# Patient Record
Sex: MALE | HISPANIC OR LATINO | URBAN - METROPOLITAN AREA
[De-identification: names, ages, dates, MRNs, and addresses within clinical notes are randomized per-mention and may not be internally consistent; named-entity substitution may affect disease eponyms.]

---

## 2018-03-02 ENCOUNTER — OUTPATIENT (OUTPATIENT)
Dept: OUTPATIENT SERVICES | Facility: HOSPITAL | Age: 83
LOS: 1 days | Discharge: HOME | End: 2018-03-02
Payer: MEDICARE

## 2018-03-02 VITALS
RESPIRATION RATE: 14 BRPM | DIASTOLIC BLOOD PRESSURE: 59 MMHG | WEIGHT: 166.01 LBS | HEIGHT: 67 IN | TEMPERATURE: 97 F | HEART RATE: 53 BPM | SYSTOLIC BLOOD PRESSURE: 208 MMHG | OXYGEN SATURATION: 98 %

## 2018-03-02 DIAGNOSIS — Z98.890 OTHER SPECIFIED POSTPROCEDURAL STATES: Chronic | ICD-10-CM

## 2018-03-02 DIAGNOSIS — I47.1 SUPRAVENTRICULAR TACHYCARDIA: ICD-10-CM

## 2018-03-02 LAB
ANION GAP SERPL CALC-SCNC: 4 MMOL/L — LOW (ref 5–17)
BASOPHILS # BLD AUTO: 0.1 K/UL — SIGNIFICANT CHANGE UP (ref 0–0.2)
BASOPHILS NFR BLD AUTO: 1.3 % — SIGNIFICANT CHANGE UP (ref 0–2)
BUN SERPL-MCNC: 38 MG/DL — HIGH (ref 7–23)
CALCIUM SERPL-MCNC: 8.7 MG/DL — SIGNIFICANT CHANGE UP (ref 8.5–10.1)
CHLORIDE SERPL-SCNC: 110 MMOL/L — HIGH (ref 96–108)
CO2 SERPL-SCNC: 27 MMOL/L — SIGNIFICANT CHANGE UP (ref 22–31)
CREAT SERPL-MCNC: 2.03 MG/DL — HIGH (ref 0.5–1.3)
EOSINOPHIL # BLD AUTO: 0.2 K/UL — SIGNIFICANT CHANGE UP (ref 0–0.5)
EOSINOPHIL NFR BLD AUTO: 2.9 % — SIGNIFICANT CHANGE UP (ref 0–6)
GLUCOSE SERPL-MCNC: 90 MG/DL — SIGNIFICANT CHANGE UP (ref 70–99)
HCT VFR BLD CALC: 40.4 % — SIGNIFICANT CHANGE UP (ref 39–50)
HGB BLD-MCNC: 13.2 G/DL — SIGNIFICANT CHANGE UP (ref 13–17)
LYMPHOCYTES # BLD AUTO: 1.8 K/UL — SIGNIFICANT CHANGE UP (ref 1–3.3)
LYMPHOCYTES # BLD AUTO: 25.7 % — SIGNIFICANT CHANGE UP (ref 13–44)
MCHC RBC-ENTMCNC: 30.7 PG — SIGNIFICANT CHANGE UP (ref 27–34)
MCHC RBC-ENTMCNC: 32.6 GM/DL — SIGNIFICANT CHANGE UP (ref 32–36)
MCV RBC AUTO: 94.1 FL — SIGNIFICANT CHANGE UP (ref 80–100)
MONOCYTES # BLD AUTO: 0.5 K/UL — SIGNIFICANT CHANGE UP (ref 0–0.9)
MONOCYTES NFR BLD AUTO: 7.8 % — SIGNIFICANT CHANGE UP (ref 2–14)
NEUTROPHILS # BLD AUTO: 4.3 K/UL — SIGNIFICANT CHANGE UP (ref 1.8–7.4)
NEUTROPHILS NFR BLD AUTO: 62.2 % — SIGNIFICANT CHANGE UP (ref 43–77)
PLATELET # BLD AUTO: 190 K/UL — SIGNIFICANT CHANGE UP (ref 150–400)
POTASSIUM SERPL-MCNC: 5.4 MMOL/L — HIGH (ref 3.5–5.3)
POTASSIUM SERPL-SCNC: 5.4 MMOL/L — HIGH (ref 3.5–5.3)
RBC # BLD: 4.29 M/UL — SIGNIFICANT CHANGE UP (ref 4.2–5.8)
RBC # FLD: 13.1 % — SIGNIFICANT CHANGE UP (ref 10.3–14.5)
SODIUM SERPL-SCNC: 141 MMOL/L — SIGNIFICANT CHANGE UP (ref 135–145)
WBC # BLD: 7 K/UL — SIGNIFICANT CHANGE UP (ref 3.8–10.5)
WBC # FLD AUTO: 7 K/UL — SIGNIFICANT CHANGE UP (ref 3.8–10.5)

## 2018-03-02 PROCEDURE — 93010 ELECTROCARDIOGRAM REPORT: CPT

## 2018-03-02 NOTE — H&P PST ADULT - PMH
Anxiety disorder    Arrhythmia    Dementia    Hypertension  was taken off medication by Dr Fischer 2016  Prostate cancer    Pure hypercholesterolemia

## 2018-03-02 NOTE — H&P PST ADULT - HISTORY OF PRESENT ILLNESS
86 yo  male with dementia presents to PST. Understands & speaks some english but daughter Day providing information. Daughter reports pt was having episodes of "passing out" in Oct/Nov 2016 & had LINQ placed nov 2016 by Dr Em. Daughter states reports from the LINQ indicate episodes  a "fast heart rate". Denies chest pain or syncope. 86 yo  male with dementia presents to PST. Understands & speaks little english but daughter Day providing information. Daughter reports pt was having episodes of "passing out" in Oct/Nov 2016 & had LINQ placed nov 2016 by Dr Em. Daughter states reports from the LINQ indicate episodes  a "fast heart rate". Denies chest pain or syncope.

## 2018-03-02 NOTE — H&P PST ADULT - ASSESSMENT
yo scheduled for   with    on     1. Labs as per surgeon  2. EKG  3. Medical clearance with  4. discussed EZ sponges & day of procedure instructions 84 yo male scheduled for cardiac ablation on 3/12/18  with Dr. Em     1. Labs as per surgeon  2. EKG  3. discussed EZ sponges with pt & daughter  4. Medication & day of procedure instructions as per EP staff 86 yo male scheduled for cardiac ablation on 3/12/18  with Dr. Em     1. Labs as per surgeon  2. EKG  3. discussed EZ sponges with pt & daughter  4. Medication & day of procedure instructions as per EP staff  Daughter Day going to address elevated BP readings with Dr Fischer to possibly restart BP meds

## 2018-03-02 NOTE — H&P PST ADULT - PSH
H/O left knee surgery    H/O right inguinal hernia repair    History of loop recorder  LINQ hear monitor placed

## 2018-03-12 ENCOUNTER — OUTPATIENT (OUTPATIENT)
Dept: OUTPATIENT SERVICES | Facility: HOSPITAL | Age: 83
LOS: 1 days | Discharge: HOME | End: 2018-03-12
Payer: MEDICARE

## 2018-03-12 VITALS
HEIGHT: 67 IN | SYSTOLIC BLOOD PRESSURE: 214 MMHG | RESPIRATION RATE: 18 BRPM | TEMPERATURE: 97 F | HEART RATE: 55 BPM | DIASTOLIC BLOOD PRESSURE: 63 MMHG | OXYGEN SATURATION: 100 % | WEIGHT: 164.91 LBS

## 2018-03-12 DIAGNOSIS — Z98.890 OTHER SPECIFIED POSTPROCEDURAL STATES: Chronic | ICD-10-CM

## 2018-03-12 LAB
ANION GAP SERPL CALC-SCNC: 8 MMOL/L — SIGNIFICANT CHANGE UP (ref 5–17)
BUN SERPL-MCNC: 34 MG/DL — HIGH (ref 7–23)
CALCIUM SERPL-MCNC: 8.9 MG/DL — SIGNIFICANT CHANGE UP (ref 8.5–10.1)
CHLORIDE SERPL-SCNC: 107 MMOL/L — SIGNIFICANT CHANGE UP (ref 96–108)
CO2 SERPL-SCNC: 26 MMOL/L — SIGNIFICANT CHANGE UP (ref 22–31)
CREAT SERPL-MCNC: 2.01 MG/DL — HIGH (ref 0.5–1.3)
GLUCOSE SERPL-MCNC: 96 MG/DL — SIGNIFICANT CHANGE UP (ref 70–99)
MAGNESIUM SERPL-MCNC: 2.3 MG/DL — SIGNIFICANT CHANGE UP (ref 1.6–2.6)
POTASSIUM SERPL-MCNC: 4.1 MMOL/L — SIGNIFICANT CHANGE UP (ref 3.5–5.3)
POTASSIUM SERPL-SCNC: 4.1 MMOL/L — SIGNIFICANT CHANGE UP (ref 3.5–5.3)
SODIUM SERPL-SCNC: 141 MMOL/L — SIGNIFICANT CHANGE UP (ref 135–145)

## 2018-03-12 RX ORDER — ISOPROTERENOL HYDROCHLORIDE 1 MG/5ML
1 INJECTION, SOLUTION INTRACARDIAC; INTRAMUSCULAR; INTRAVENOUS; SUBCUTANEOUS
Qty: 1 | Refills: 0 | Status: DISCONTINUED | OUTPATIENT
Start: 2018-03-12 | End: 2018-03-13

## 2018-03-12 RX ORDER — ATORVASTATIN CALCIUM 80 MG/1
10 TABLET, FILM COATED ORAL AT BEDTIME
Qty: 0 | Refills: 0 | Status: DISCONTINUED | OUTPATIENT
Start: 2018-03-12 | End: 2018-03-13

## 2018-03-12 RX ORDER — CEFAZOLIN SODIUM 1 G
1000 VIAL (EA) INJECTION ONCE
Qty: 0 | Refills: 0 | Status: DISCONTINUED | OUTPATIENT
Start: 2018-03-12 | End: 2018-03-12

## 2018-03-12 RX ORDER — CITALOPRAM 10 MG/1
10 TABLET, FILM COATED ORAL DAILY
Qty: 0 | Refills: 0 | Status: DISCONTINUED | OUTPATIENT
Start: 2018-03-12 | End: 2018-03-13

## 2018-03-12 RX ORDER — ASPIRIN/CALCIUM CARB/MAGNESIUM 324 MG
81 TABLET ORAL DAILY
Qty: 0 | Refills: 0 | Status: DISCONTINUED | OUTPATIENT
Start: 2018-03-12 | End: 2018-03-13

## 2018-03-12 RX ORDER — AMLODIPINE BESYLATE 2.5 MG/1
5 TABLET ORAL DAILY
Qty: 0 | Refills: 0 | Status: DISCONTINUED | OUTPATIENT
Start: 2018-03-12 | End: 2018-03-13

## 2018-03-12 RX ADMIN — AMLODIPINE BESYLATE 5 MILLIGRAM(S): 2.5 TABLET ORAL at 17:02

## 2018-03-12 RX ADMIN — ATORVASTATIN CALCIUM 10 MILLIGRAM(S): 80 TABLET, FILM COATED ORAL at 21:13

## 2018-03-12 NOTE — PACU DISCHARGE NOTE - COMMENTS
report given to Dayanara MEDINA, placed on cardiac monitor with rhythm verified by giovani, monitor tech; pt transported via stretcher by transporter with daughter at bedside

## 2018-03-12 NOTE — ASU PATIENT PROFILE, ADULT - ABILITY TO HEAR (WITH HEARING AID OR HEARING APPLIANCE IF NORMALLY USED):
hard of hearing/Mildly to Moderately Impaired: difficulty hearing in some environments or speaker may need to increase volume or speak distinctly

## 2018-03-13 ENCOUNTER — TRANSCRIPTION ENCOUNTER (OUTPATIENT)
Age: 83
End: 2018-03-13

## 2018-03-13 VITALS
OXYGEN SATURATION: 96 % | RESPIRATION RATE: 18 BRPM | HEART RATE: 63 BPM | DIASTOLIC BLOOD PRESSURE: 57 MMHG | TEMPERATURE: 98 F | SYSTOLIC BLOOD PRESSURE: 166 MMHG

## 2018-03-13 LAB
ALBUMIN SERPL ELPH-MCNC: 3.2 G/DL — LOW (ref 3.3–5)
ALP SERPL-CCNC: 91 U/L — SIGNIFICANT CHANGE UP (ref 40–120)
ALT FLD-CCNC: 13 U/L — SIGNIFICANT CHANGE UP (ref 12–78)
ANION GAP SERPL CALC-SCNC: 8 MMOL/L — SIGNIFICANT CHANGE UP (ref 5–17)
AST SERPL-CCNC: 20 U/L — SIGNIFICANT CHANGE UP (ref 15–37)
BILIRUB SERPL-MCNC: 0.5 MG/DL — SIGNIFICANT CHANGE UP (ref 0.2–1.2)
BUN SERPL-MCNC: 34 MG/DL — HIGH (ref 7–23)
CALCIUM SERPL-MCNC: 8.3 MG/DL — LOW (ref 8.5–10.1)
CHLORIDE SERPL-SCNC: 110 MMOL/L — HIGH (ref 96–108)
CO2 SERPL-SCNC: 24 MMOL/L — SIGNIFICANT CHANGE UP (ref 22–31)
CREAT SERPL-MCNC: 1.66 MG/DL — HIGH (ref 0.5–1.3)
GLUCOSE SERPL-MCNC: 85 MG/DL — SIGNIFICANT CHANGE UP (ref 70–99)
POTASSIUM SERPL-MCNC: 4.2 MMOL/L — SIGNIFICANT CHANGE UP (ref 3.5–5.3)
POTASSIUM SERPL-SCNC: 4.2 MMOL/L — SIGNIFICANT CHANGE UP (ref 3.5–5.3)
PROT SERPL-MCNC: 6.9 GM/DL — SIGNIFICANT CHANGE UP (ref 6–8.3)
SODIUM SERPL-SCNC: 142 MMOL/L — SIGNIFICANT CHANGE UP (ref 135–145)

## 2018-03-13 PROCEDURE — 93010 ELECTROCARDIOGRAM REPORT: CPT

## 2018-03-13 RX ADMIN — Medication 81 MILLIGRAM(S): at 12:13

## 2018-03-13 RX ADMIN — AMLODIPINE BESYLATE 5 MILLIGRAM(S): 2.5 TABLET ORAL at 05:06

## 2018-03-13 NOTE — DISCHARGE NOTE ADULT - MEDICATION SUMMARY - MEDICATIONS TO TAKE
I will START or STAY ON the medications listed below when I get home from the hospital:    Aspir 81 oral delayed release tablet  -- 1 tab(s) by mouth once a day  -- Indication: For HL    CeleXA 10 mg oral tablet  -- 1 tab(s) by mouth once a day  -- Indication: For antidepressant    Lipitor 10 mg oral tablet  -- 1 tab(s) by mouth once a day  -- Indication: For HL    amLODIPine 5 mg oral tablet  -- 1 tab(s) by mouth once a day  -- Indication: For HTN

## 2018-03-13 NOTE — DISCHARGE NOTE ADULT - PLAN OF CARE
to remain free of infection Resume all medications, no unnecessary stair climbing for 24-48 hrs.  May shower.  To call for any questions or concerns.

## 2018-03-13 NOTE — DISCHARGE NOTE ADULT - CARE PROVIDER_API CALL
Harvey Em), Cardiovascular Disease; Internal Medicine  [address] [place] [address]  Phone: [phone]  Fax: [phone]

## 2018-03-13 NOTE — DISCHARGE NOTE ADULT - PATIENT PORTAL LINK FT
You can access the [facility] Patient Portal, offered by [facility], by registering with the following website: [email or web address]

## 2018-03-13 NOTE — DISCHARGE NOTE ADULT - HOSPITAL COURSE
85 yr old male with recurrent palpitations found to have narrow complex, regular tachycardia on ILR interrogation and he presented for EP study and ablation.    3/13/18: s/p EP study, SVT ablation POD #1.  TELE: sinus 50-60's, APC's  EKG: Sinus rhythm, APC's, 65 bpm, AL 166ms, QTc 490ms     MEDICATIONS  (STANDING):  amLODIPine   Tablet 5 milliGRAM(s) Oral daily  aspirin enteric coated 81 milliGRAM(s) Oral daily  atorvastatin 10 milliGRAM(s) Oral at bedtime  citalopram 10 milliGRAM(s) Oral daily    Allergies    No Known Allergies  03-13    142  |  110<H>  |  34<H>  ----------------------------<  85  4.2   |  24  |  1.66<H>    Ca    8.3<L>      13 Mar 2018 06:00  Mg     2.3     03-12    TPro  6.9  /  Alb  3.2<L>  /  TBili  0.5  /  DBili  x   /  AST  20  /  ALT  13  /  AlkPhos  91  03-13    Vital Signs Last 24 Hrs  T(C): 36.9 (13 Mar 2018 04:57), Max: 36.9 (13 Mar 2018 04:57)  T(F): 98.5 (13 Mar 2018 04:57), Max: 98.5 (13 Mar 2018 04:57)  HR: 72 (13 Mar 2018 04:57) (55 - 72)  BP: 157/41 (13 Mar 2018 04:57) (157/41 - 214/63)  BP(mean): 70 (13 Mar 2018 04:57) (70 - 70)  RR: 16 (12 Mar 2018 18:06) (16 - 20)  SpO2: 98% (13 Mar 2018 04:57) (97% - 100%)    REVIEW OF SYSTEMS:    CONSTITUTIONAL: No weakness, fevers or chills  EYES/ENT: No visual changes;  No vertigo or throat pain   NECK: No pain or stiffness  RESPIRATORY: No cough, wheezing, hemoptysis; No shortness of breath  CARDIOVASCULAR: No chest pain or palpitations  GASTROINTESTINAL: No abdominal or epigastric pain. No nausea, vomiting, or hematemesis; No diarrhea or constipation. No melena or hematochezia.  GENITOURINARY: No dysuria, frequency or hematuria  NEUROLOGICAL: No numbness or weakness  SKIN: No itching, burning, rashes, or lesions  All other review of systems is negative unless indicated above     PE:  General: WN/WD NAD  Neurology: Alert to name and place, nonfocal, GUY x 4  Respiratory: CTA B/L  CV: RRR, S1S2, no murmurs, rubs or gallops  Abdominal: Soft, NT, ND +BS  Extremities: No edema, + peripheral pulses. Bilat groins soft, no bleeding or hematomas.    Plan:  Continue prior medications.  F/U with Dr. Em as outpatient on 3/26/18.  Instructions to be reviewed with daughter.  Stable for discharge to home.

## 2018-03-15 DIAGNOSIS — I10 ESSENTIAL (PRIMARY) HYPERTENSION: ICD-10-CM

## 2018-03-15 DIAGNOSIS — E78.5 HYPERLIPIDEMIA, UNSPECIFIED: ICD-10-CM

## 2018-03-15 DIAGNOSIS — I47.1 SUPRAVENTRICULAR TACHYCARDIA: ICD-10-CM

## 2019-12-31 PROBLEM — I49.9 CARDIAC ARRHYTHMIA, UNSPECIFIED: Chronic | Status: ACTIVE | Noted: 2018-03-02

## 2019-12-31 PROBLEM — F41.9 ANXIETY DISORDER, UNSPECIFIED: Chronic | Status: ACTIVE | Noted: 2018-03-02

## 2019-12-31 PROBLEM — I10 ESSENTIAL (PRIMARY) HYPERTENSION: Chronic | Status: ACTIVE | Noted: 2018-03-02

## 2019-12-31 PROBLEM — E78.00 PURE HYPERCHOLESTEROLEMIA, UNSPECIFIED: Chronic | Status: ACTIVE | Noted: 2018-03-02

## 2019-12-31 PROBLEM — C61 MALIGNANT NEOPLASM OF PROSTATE: Chronic | Status: ACTIVE | Noted: 2018-03-02

## 2019-12-31 PROBLEM — F03.90 UNSPECIFIED DEMENTIA WITHOUT BEHAVIORAL DISTURBANCE: Chronic | Status: ACTIVE | Noted: 2018-03-02

## 2020-03-02 ENCOUNTER — INPATIENT (INPATIENT)
Facility: HOSPITAL | Age: 85
LOS: 2 days | Discharge: HOME HEALTH | DRG: 689 | End: 2020-03-05
Attending: FAMILY MEDICINE | Admitting: FAMILY MEDICINE
Payer: MEDICARE

## 2020-03-02 VITALS
TEMPERATURE: 100 F | HEIGHT: 69 IN | HEART RATE: 67 BPM | WEIGHT: 160.06 LBS | RESPIRATION RATE: 20 BRPM | SYSTOLIC BLOOD PRESSURE: 175 MMHG | DIASTOLIC BLOOD PRESSURE: 61 MMHG | OXYGEN SATURATION: 97 %

## 2020-03-02 DIAGNOSIS — N39.0 URINARY TRACT INFECTION, SITE NOT SPECIFIED: ICD-10-CM

## 2020-03-02 DIAGNOSIS — Z98.890 OTHER SPECIFIED POSTPROCEDURAL STATES: Chronic | ICD-10-CM

## 2020-03-02 LAB
ALBUMIN SERPL ELPH-MCNC: 3.2 G/DL — LOW (ref 3.3–5)
ALP SERPL-CCNC: 89 U/L — SIGNIFICANT CHANGE UP (ref 40–120)
ALT FLD-CCNC: 15 U/L — SIGNIFICANT CHANGE UP (ref 12–78)
ANION GAP SERPL CALC-SCNC: 6 MMOL/L — SIGNIFICANT CHANGE UP (ref 5–17)
APPEARANCE UR: CLEAR — SIGNIFICANT CHANGE UP
AST SERPL-CCNC: 19 U/L — SIGNIFICANT CHANGE UP (ref 15–37)
BASOPHILS # BLD AUTO: 0.03 K/UL — SIGNIFICANT CHANGE UP (ref 0–0.2)
BASOPHILS NFR BLD AUTO: 0.3 % — SIGNIFICANT CHANGE UP (ref 0–2)
BILIRUB SERPL-MCNC: 0.7 MG/DL — SIGNIFICANT CHANGE UP (ref 0.2–1.2)
BILIRUB UR-MCNC: NEGATIVE — SIGNIFICANT CHANGE UP
BUN SERPL-MCNC: 37 MG/DL — HIGH (ref 7–23)
CALCIUM SERPL-MCNC: 8.5 MG/DL — SIGNIFICANT CHANGE UP (ref 8.5–10.1)
CHLORIDE SERPL-SCNC: 108 MMOL/L — SIGNIFICANT CHANGE UP (ref 96–108)
CO2 SERPL-SCNC: 24 MMOL/L — SIGNIFICANT CHANGE UP (ref 22–31)
COLOR SPEC: YELLOW — SIGNIFICANT CHANGE UP
CREAT SERPL-MCNC: 2.64 MG/DL — HIGH (ref 0.5–1.3)
DIFF PNL FLD: ABNORMAL
EOSINOPHIL # BLD AUTO: 0.23 K/UL — SIGNIFICANT CHANGE UP (ref 0–0.5)
EOSINOPHIL NFR BLD AUTO: 2.2 % — SIGNIFICANT CHANGE UP (ref 0–6)
GLUCOSE SERPL-MCNC: 88 MG/DL — SIGNIFICANT CHANGE UP (ref 70–99)
GLUCOSE UR QL: NEGATIVE MG/DL — SIGNIFICANT CHANGE UP
HCT VFR BLD CALC: 34.9 % — LOW (ref 39–50)
HGB BLD-MCNC: 11.5 G/DL — LOW (ref 13–17)
IMM GRANULOCYTES NFR BLD AUTO: 0.4 % — SIGNIFICANT CHANGE UP (ref 0–1.5)
KETONES UR-MCNC: NEGATIVE — SIGNIFICANT CHANGE UP
LACTATE SERPL-SCNC: 1.6 MMOL/L — SIGNIFICANT CHANGE UP (ref 0.7–2)
LEUKOCYTE ESTERASE UR-ACNC: ABNORMAL
LYMPHOCYTES # BLD AUTO: 1.57 K/UL — SIGNIFICANT CHANGE UP (ref 1–3.3)
LYMPHOCYTES # BLD AUTO: 14.7 % — SIGNIFICANT CHANGE UP (ref 13–44)
MCHC RBC-ENTMCNC: 30.9 PG — SIGNIFICANT CHANGE UP (ref 27–34)
MCHC RBC-ENTMCNC: 33 GM/DL — SIGNIFICANT CHANGE UP (ref 32–36)
MCV RBC AUTO: 93.8 FL — SIGNIFICANT CHANGE UP (ref 80–100)
MONOCYTES # BLD AUTO: 1.34 K/UL — HIGH (ref 0–0.9)
MONOCYTES NFR BLD AUTO: 12.6 % — SIGNIFICANT CHANGE UP (ref 2–14)
NEUTROPHILS # BLD AUTO: 7.45 K/UL — HIGH (ref 1.8–7.4)
NEUTROPHILS NFR BLD AUTO: 69.8 % — SIGNIFICANT CHANGE UP (ref 43–77)
NITRITE UR-MCNC: NEGATIVE — SIGNIFICANT CHANGE UP
PH UR: 5 — SIGNIFICANT CHANGE UP (ref 5–8)
PLATELET # BLD AUTO: 189 K/UL — SIGNIFICANT CHANGE UP (ref 150–400)
POTASSIUM SERPL-MCNC: 4 MMOL/L — SIGNIFICANT CHANGE UP (ref 3.5–5.3)
POTASSIUM SERPL-SCNC: 4 MMOL/L — SIGNIFICANT CHANGE UP (ref 3.5–5.3)
PROT SERPL-MCNC: 6.9 GM/DL — SIGNIFICANT CHANGE UP (ref 6–8.3)
PROT UR-MCNC: 100 MG/DL
RBC # BLD: 3.72 M/UL — LOW (ref 4.2–5.8)
RBC # FLD: 13.2 % — SIGNIFICANT CHANGE UP (ref 10.3–14.5)
SODIUM SERPL-SCNC: 138 MMOL/L — SIGNIFICANT CHANGE UP (ref 135–145)
SP GR SPEC: 1.01 — SIGNIFICANT CHANGE UP (ref 1.01–1.02)
TROPONIN I SERPL-MCNC: <0.015 NG/ML — SIGNIFICANT CHANGE UP (ref 0.01–0.04)
TROPONIN I SERPL-MCNC: <0.015 NG/ML — SIGNIFICANT CHANGE UP (ref 0.01–0.04)
UROBILINOGEN FLD QL: NEGATIVE MG/DL — SIGNIFICANT CHANGE UP
WBC # BLD: 10.66 K/UL — HIGH (ref 3.8–10.5)
WBC # FLD AUTO: 10.66 K/UL — HIGH (ref 3.8–10.5)

## 2020-03-02 PROCEDURE — 76770 US EXAM ABDO BACK WALL COMP: CPT | Mod: 26

## 2020-03-02 PROCEDURE — 71045 X-RAY EXAM CHEST 1 VIEW: CPT | Mod: 26

## 2020-03-02 PROCEDURE — 84484 ASSAY OF TROPONIN QUANT: CPT

## 2020-03-02 PROCEDURE — 70450 CT HEAD/BRAIN W/O DYE: CPT | Mod: 26

## 2020-03-02 PROCEDURE — 95816 EEG AWAKE AND DROWSY: CPT

## 2020-03-02 PROCEDURE — 97530 THERAPEUTIC ACTIVITIES: CPT | Mod: GP

## 2020-03-02 PROCEDURE — 76770 US EXAM ABDO BACK WALL COMP: CPT

## 2020-03-02 PROCEDURE — 93010 ELECTROCARDIOGRAM REPORT: CPT

## 2020-03-02 PROCEDURE — 70551 MRI BRAIN STEM W/O DYE: CPT

## 2020-03-02 PROCEDURE — 80061 LIPID PANEL: CPT

## 2020-03-02 PROCEDURE — 99285 EMERGENCY DEPT VISIT HI MDM: CPT

## 2020-03-02 PROCEDURE — 97162 PT EVAL MOD COMPLEX 30 MIN: CPT | Mod: GP

## 2020-03-02 PROCEDURE — 99223 1ST HOSP IP/OBS HIGH 75: CPT | Mod: AI

## 2020-03-02 PROCEDURE — 80048 BASIC METABOLIC PNL TOTAL CA: CPT

## 2020-03-02 PROCEDURE — 36415 COLL VENOUS BLD VENIPUNCTURE: CPT

## 2020-03-02 PROCEDURE — 97116 GAIT TRAINING THERAPY: CPT | Mod: GP

## 2020-03-02 RX ORDER — ATORVASTATIN CALCIUM 80 MG/1
20 TABLET, FILM COATED ORAL AT BEDTIME
Refills: 0 | Status: DISCONTINUED | OUTPATIENT
Start: 2020-03-02 | End: 2020-03-05

## 2020-03-02 RX ORDER — SODIUM CHLORIDE 9 MG/ML
3 INJECTION INTRAMUSCULAR; INTRAVENOUS; SUBCUTANEOUS ONCE
Refills: 0 | Status: COMPLETED | OUTPATIENT
Start: 2020-03-02 | End: 2020-03-02

## 2020-03-02 RX ORDER — ASPIRIN/CALCIUM CARB/MAGNESIUM 324 MG
81 TABLET ORAL DAILY
Refills: 0 | Status: DISCONTINUED | OUTPATIENT
Start: 2020-03-02 | End: 2020-03-05

## 2020-03-02 RX ORDER — CEFTRIAXONE 500 MG/1
1000 INJECTION, POWDER, FOR SOLUTION INTRAMUSCULAR; INTRAVENOUS ONCE
Refills: 0 | Status: COMPLETED | OUTPATIENT
Start: 2020-03-02 | End: 2020-03-02

## 2020-03-02 RX ORDER — CITALOPRAM 10 MG/1
10 TABLET, FILM COATED ORAL DAILY
Refills: 0 | Status: DISCONTINUED | OUTPATIENT
Start: 2020-03-02 | End: 2020-03-05

## 2020-03-02 RX ORDER — AMLODIPINE BESYLATE 2.5 MG/1
5 TABLET ORAL DAILY
Refills: 0 | Status: DISCONTINUED | OUTPATIENT
Start: 2020-03-02 | End: 2020-03-05

## 2020-03-02 RX ORDER — ACETAMINOPHEN 500 MG
650 TABLET ORAL EVERY 6 HOURS
Refills: 0 | Status: DISCONTINUED | OUTPATIENT
Start: 2020-03-02 | End: 2020-03-05

## 2020-03-02 RX ORDER — CHOLECALCIFEROL (VITAMIN D3) 125 MCG
2000 CAPSULE ORAL DAILY
Refills: 0 | Status: DISCONTINUED | OUTPATIENT
Start: 2020-03-02 | End: 2020-03-05

## 2020-03-02 RX ORDER — SODIUM CHLORIDE 9 MG/ML
1000 INJECTION INTRAMUSCULAR; INTRAVENOUS; SUBCUTANEOUS ONCE
Refills: 0 | Status: COMPLETED | OUTPATIENT
Start: 2020-03-02 | End: 2020-03-02

## 2020-03-02 RX ORDER — HEPARIN SODIUM 5000 [USP'U]/ML
5000 INJECTION INTRAVENOUS; SUBCUTANEOUS EVERY 12 HOURS
Refills: 0 | Status: DISCONTINUED | OUTPATIENT
Start: 2020-03-02 | End: 2020-03-05

## 2020-03-02 RX ORDER — SODIUM CHLORIDE 9 MG/ML
500 INJECTION INTRAMUSCULAR; INTRAVENOUS; SUBCUTANEOUS
Refills: 0 | Status: DISCONTINUED | OUTPATIENT
Start: 2020-03-02 | End: 2020-03-04

## 2020-03-02 RX ORDER — CEFTRIAXONE 500 MG/1
1000 INJECTION, POWDER, FOR SOLUTION INTRAMUSCULAR; INTRAVENOUS EVERY 24 HOURS
Refills: 0 | Status: DISCONTINUED | OUTPATIENT
Start: 2020-03-03 | End: 2020-03-05

## 2020-03-02 RX ADMIN — SODIUM CHLORIDE 2000 MILLILITER(S): 9 INJECTION INTRAMUSCULAR; INTRAVENOUS; SUBCUTANEOUS at 14:16

## 2020-03-02 RX ADMIN — SODIUM CHLORIDE 1000 MILLILITER(S): 9 INJECTION INTRAMUSCULAR; INTRAVENOUS; SUBCUTANEOUS at 17:11

## 2020-03-02 RX ADMIN — SODIUM CHLORIDE 3 MILLILITER(S): 9 INJECTION INTRAMUSCULAR; INTRAVENOUS; SUBCUTANEOUS at 14:24

## 2020-03-02 RX ADMIN — CEFTRIAXONE 1000 MILLIGRAM(S): 500 INJECTION, POWDER, FOR SOLUTION INTRAMUSCULAR; INTRAVENOUS at 13:35

## 2020-03-02 RX ADMIN — ATORVASTATIN CALCIUM 20 MILLIGRAM(S): 80 TABLET, FILM COATED ORAL at 22:32

## 2020-03-02 RX ADMIN — AMLODIPINE BESYLATE 5 MILLIGRAM(S): 2.5 TABLET ORAL at 19:48

## 2020-03-02 NOTE — ED PROVIDER NOTE - PROGRESS NOTE DETAILS
Mike PAGAN for ED attending, Dr. Medina: Discussed with Dr. Jean of neurology who agrees with ASA and will consult pt. Attending Medina, daughter updated on plan.  Idris Tee for admission

## 2020-03-02 NOTE — H&P ADULT - NSHPPHYSICALEXAM_GEN_ALL_CORE
ICU Vital Signs Last 24 Hrs    T(F): 99.1 (02 Mar 2020 11:14), Max: 99.7 (02 Mar 2020 11:02)  HR: 67 (02 Mar 2020 13:37) (67 - 67)  BP: 176/58 (02 Mar 2020 13:37) (175/61 - 176/58)  BP(mean): 87 (02 Mar 2020 13:37) (87 - 87)    RR: 15 (02 Mar 2020 13:37) (15 - 20)  SpO2: 97% (02 Mar 2020 11:02) (97% - 97%)

## 2020-03-02 NOTE — ED PROVIDER NOTE - CLINICAL SUMMARY MEDICAL DECISION MAKING FREE TEXT BOX
Pt presents for weakness, difficulty walking, last known normal was prior to going to sleep at night. Sx started at 3AM. Given >3 hours since onset of sx, pt not a TPA candidate. Plan: labs, CT. Pt presents for weakness, difficulty walking, last known normal was prior to going to sleep at night. Sx started at 3AM. Given >3 hours since onset of sx, pt not a TPA candidate. Plan: labs, CT.  pt with more then 3 hours of symptoms, not TPA candidate.

## 2020-03-02 NOTE — H&P ADULT - NSICDXPASTMEDICALHX_GEN_ALL_CORE_FT
PAST MEDICAL HISTORY:  Anxiety disorder     Arrhythmia     Dementia     Hypertension was taken off medication by Dr Fischer 2016    Prostate cancer     Pure hypercholesterolemia

## 2020-03-02 NOTE — H&P ADULT - NSHPSOCIALHISTORY_GEN_ALL_CORE
Pt lives with his family.  He quit smoking a pipe 40 yrs ago.   No EtOH/drug use.  Pt ambulates without a cane.

## 2020-03-02 NOTE — H&P ADULT - REASON FOR ADMISSION
Metabolic Encephalapathy  Slurred Speech  UTI  DELMY, Cr 2.64 Metabolic Encephalopathy  Slurred Speech  UTI  DELMY, Cr 2.64 Metabolic Encephalopathy  Slurred Speech  UTI  DELMY, Acute on CKD Cr 2.64, last known creat 1.66  Fever

## 2020-03-02 NOTE — PATIENT PROFILE ADULT - NSPROEDALEARNPREFOTH_GEN_A_NUR
individual instruction/audio/skill demonstration/written material/computer/internet/verbal instruction

## 2020-03-02 NOTE — PATIENT PROFILE ADULT - JOB HELP
Addended by: FELICIA ARIZA on: 8/7/2018 11:59 AM     Modules accepted: Orders    
Addended by: NANI HENDRICKS on: 8/7/2018 12:11 PM     Modules accepted: Orders    
no

## 2020-03-02 NOTE — CONSULT NOTE ADULT - SUBJECTIVE AND OBJECTIVE BOX
Patient is a 87y old  Male who presents with a chief complaint of confusion, shakiness, slurred speech.    HPI: Mr. Valente is an 87 year old man brought to the ED by family. His daughter is at bedside and is translating and providing history as he is Gibraltarian speaking.  She states that yesterday he was in his usual state of health.  At about 3 AM his granddaughter saw that he was awake and noticed that he looked a little bit shaky. He was also noted to have some slowness of his speech with difficulty getting the words out.  He has noted to have twitching in his face, arms and legs.    He has mild dementia but is functioning well at home.    He had episodes in the past of "turning off"  He saw Dr. Manzo in the past for neurology (last seen in 2016). Two EEGs in September 2016 and October 2016 were unremarkable.   He was referred to cardiology and was found to have an arrhythmia (unclear which type) for which he had a catheter ablation.      PAST MEDICAL & SURGICAL HISTORY:  Hypertension: was taken off medication by Dr Fischer 2016  Prostate cancer  Dementia  Anxiety disorder  Pure hypercholesterolemia  Arrhythmia  H/O right inguinal hernia repair  H/O left knee surgery  History of loop recorder: LINQ hear monitor placed      FAMILY HISTORY:  No pertinent family history in first degree relatives      Social Hx:  Nonsmoker, no drug or alcohol use    MEDICATIONS  (home):  Aspirin 81 mg/day  Atorvastatin 10 mg/day  Citalopram 10 mg/day  Norvasc 5 mg/day   Vitamin D    Allergies    No Known Allergies    Intolerances        ROS: Pertinent positives in HPI, all other ROS were reviewed and are negative.      Vital Signs Last 24 Hrs  T(C): 37.3 (02 Mar 2020 11:14), Max: 37.6 (02 Mar 2020 11:02)  T(F): 99.1 (02 Mar 2020 11:14), Max: 99.7 (02 Mar 2020 11:02)  HR: 67 (02 Mar 2020 13:37) (67 - 67)  BP: 176/58 (02 Mar 2020 13:37) (175/61 - 176/58)  BP(mean): 87 (02 Mar 2020 13:37) (87 - 87)  RR: 15 (02 Mar 2020 13:37) (15 - 20)  SpO2: 97% (02 Mar 2020 11:02) (97% - 97%)        Constitutional: awake and alert.  HEENT: PERRLA, EOMI,   Neck: Supple.  Respiratory: Breath sounds are clear bilaterally  Cardiovascular: S1 and S2, regular / irregular rhythm  Gastrointestinal: soft, nontender  Extremities:  no edema  Vascular: Carotid Bruit - no  Musculoskeletal: no joint swelling/tenderness, no abnormal movements  Skin: No rashes    Neurological exam:  HF: Awake and alert. Knows he is in the hospital. States it is February, Knows it is 2020. Naming intact. Some slowness of speech  CN: KARON, EOMI, VFF, facial sensation normal, no NLFD, tongue midline, Palate moves equally, SCM equal bilaterally  Motor: No pronator drift, Strength 5/5 in all 4 ext, normal bulk and tone, no tremor, rigidity or bradykinesia.    Sens: Intact to light touch   Reflexes: Symmetric and normal . BJ 2, BR 2, KJ 2, downgoing toes b/l  Coord:  No FNFA, dysmetria, SANJUANITA intact   Some twitching of left face, rhythmic at times. Intermittent twitch of left arm noted as well.    NIHSS:          Labs:   03-02    138  |  108  |  37<H>  ----------------------------<  88  4.0   |  24  |  2.64<H>    Ca    8.5      02 Mar 2020 11:34    TPro  6.9  /  Alb  3.2<L>  /  TBili  0.7  /  DBili  x   /  AST  19  /  ALT  15  /  AlkPhos  89  03-02                              11.5   10.66 )-----------( 189      ( 02 Mar 2020 11:34 )             34.9       Radiology:    CT head no contrast 3/2/20:  Moderate periventricular and deep white matter ischemia with old lacunar infarctions in the BILATERAL basal ganglia.

## 2020-03-02 NOTE — ED PROVIDER NOTE - OBJECTIVE STATEMENT
86 y/o male with PMHx of dementia, HTN, prostate cancer, anxiety, pure hypercholesteremia, arrhythmia, s/p loop recorder, s/p right inguinal hernia repair presents to the ED BIBA regarding AMS. Per granddaughter and son-in-law at bedside, pt has been "a little more off" today, reports he has been slurring his words since around 3AM today, has been more weak. Pt also was noticed to have increased tremors today and seemed a bit more confused than baseline. Pt has been having a mild cough and headache as well. Family noticed pt had difficulty walking down the stairs today when he is usually active and can walk without assistance. No dysuria, one sided weakness. No recent travel or sick contacts. On Atorvastatin, Amlodipine, Citalopram, baby ASA, vitamin D. Non smoker, no EtOH use. Hx limited due to pt's baseline dementia, hx obtained per family at bedside.

## 2020-03-02 NOTE — ED ADULT NURSE NOTE - OBJECTIVE STATEMENT
Patient presents to the ED BIBA regarding AMS. Per granddaughter and son-in-law at bedside, pt has been "a little more off" today, reports he has been slurring his words since around 3AM today, has been more weak. Pt also was noticed to have increased tremors today and seemed a bit more confused than baseline. Pt has been having a mild cough and headache as well. Family noticed pt had difficulty walking down the stairs today when he is usually active and can walk without assistance. No dysuria, one sided weakness. No recent travel or sick contacts. No code stroke called, Dr. Medina at bedside.

## 2020-03-02 NOTE — H&P ADULT - ASSESSMENT
Metabolic Encephalapathy  Slurred Speech  UTI  DELMY, Cr 2.64 Pt is admitted w/    Metabolic Encephalopathy   Suspected TIA, NIHSS 1  L eye ,upper face twitching  Slurred Speech  Bilat old Lacunar infarcts on CT  UTI, fever, leukocytosis  DELMY, Cr 2.64 likely pre-renal,  DDX ATN  - adm to telemetry  - apprec Neurology consult  - MRI and EEG  - s/p Rocephin, will cont  - repeat BUN /cr  - DVT prophylaxis : heparin  - IMPROVE VTE Individual Risk Assessment    RISK                                                                Points    [  ] Previous VTE                                                  3    [  ] Thrombophilia                                               2    [  ] Lower limb paralysis                                      2        (unable to hold up >15 seconds)      [  ] Current Cancer                                              2         (within 6 months)    [ X ] Immobilization > 24 hrs                                1    [  ] ICU/CCU stay > 24 hours                              1    [X  ] Age > 60                                                      1    IMPROVE VTE Score ___2______    IMPROVE Score 0-1: Low Risk, No VTE prophylaxis required for most patients, encourage ambulation.   IMPROVE Score 2-3: At risk, pharmacologic VTE prophylaxis is indicated for most patients (in the absence of a contraindication)  IMPROVE Score > or = 4: High Risk, pharmacologic VTE prophylaxis is indicated for most patients (in the absence of a contraindication) Pt is admitted w/    Metabolic Encephalopathy   Suspected TIA, NIHSS 1  L eye ,upper face twitching  Slurred Speech  Bilat old Lacunar infarcts on CT  UTI, fever 100.6F, leukocytosis  DELMY, Acute on CKDIII Cr 2.64, last known creat 1.66  likely pre-renal,  DDX ATN  - adm to telemetry  - apprec Neurology consult  - MRI and EEG  - s/p Rocephin, will cont  - s/p 1 L NS in the ED, will cont  - US shows likely renal parenchymal disease, no obstruction  - repeat BUN /cr  - DVT prophylaxis : heparin  - IMPROVE VTE Individual Risk Assessment    RISK                                                                Points    [  ] Previous VTE                                                  3    [  ] Thrombophilia                                               2    [  ] Lower limb paralysis                                      2        (unable to hold up >15 seconds)      [  ] Current Cancer                                              2         (within 6 months)    [ X ] Immobilization > 24 hrs                                1    [  ] ICU/CCU stay > 24 hours                              1    [X  ] Age > 60                                                      1    IMPROVE VTE Score ___2______    IMPROVE Score 0-1: Low Risk, No VTE prophylaxis required for most patients, encourage ambulation.   IMPROVE Score 2-3: At risk, pharmacologic VTE prophylaxis is indicated for most patients (in the absence of a contraindication)  IMPROVE Score > or = 4: High Risk, pharmacologic VTE prophylaxis is indicated for most patients (in the absence of a contraindication)

## 2020-03-02 NOTE — CONSULT NOTE ADULT - ASSESSMENT
88 yo man with history of HTN, dementia, prostate cancer, arrhythmia s/p catheter ablation who presents with mild confusion, twitching of face and extremities, word finding difficulty.  On exam he is noted to have some semirhythmic twitching of left face as well as intermittent twitching of left arm.  -Will order routine EEG to eval for seizures  -MRI brain  -Monitor on telemetry  -Continue aspirin  -Continue atorvastatin - if any infarct noted will increase dose.

## 2020-03-02 NOTE — H&P ADULT - HISTORY OF PRESENT ILLNESS
86 y/o male with PMHx of dementia, HTN, prostate cancer, anxiety, pure hypercholesteremia, arrhythmia, s/p loop recorder, s/p right inguinal hernia repair presents to the ED BIBA regarding AMS. Per granddaughter and son-in-law at bedside, pt has been "a little more off" today, reports he has been slurring his words since around 3AM today, has been more weak. Pt also was noticed to have increased tremors today and seemed a bit more confused than baseline. Pt has been having a mild cough and headache as well. Family noticed pt had difficulty walking down the stairs today when he is usually active and can walk without assistance. No dysuria, one sided weakness. No recent travel or sick contacts. On Atorvastatin, Amlodipine, Citalopram, baby ASA, vitamin D. Non smoker, no EtOH use. Hx limited due to pt's baseline dementia, hx obtained per family at bedside. Pt is an 88 y/o male with PMHx of dementia, HTN, prostate cancer, anxiety, Hyperlipidemia, arrhythmia, s/p loop recorder, s/p right inguinal hernia repair who presents via EMS with slurred speech and confusion. Pt went to sleep earlier than usual , around 6pm and hence did not take his night time meds at 7pm. He woke at 3am and later went back to sleep.  Pt was noted to be tremulous this morning while eating his oatmeal.    Pt's daughter and granddaughter reported he was slurring his words and speaking slowly this morning.  He had weakness and was unable to walk down the stairs.  His family report he is  more confused than baseline. He has been having twitching of the left eye, which his daughter reports is new,       Pt denies cp/SOB/abd pain.  His daughter reports pt had dysuria when he was urinating earlier in the ED.  Pt has poor short -term memory and  does not recall this now.      No recent travel or sick contacts.    Fam Hx:  Mother asthma,  in her 80s  Father unknown  Sister  in her s60s of cancer, unknown type

## 2020-03-02 NOTE — ED ADULT NURSE REASSESSMENT NOTE - NS ED NURSE REASSESS COMMENT FT1
Purposeful rounding done at this time. All needs addressed. Patient denies pain. Patient updated on admission status and plan of care. Daughter at bedside. Safety and comfort maintained. Will continue to monitor.
Assumed care of patient from ADAM Gray at 1545. Patient Brazilian speaking, declining use of  phone at this time, wishing to use daughter to translate. As per daughter translations patient is A&Ox3.  Patient resting comfortably in bed, denies pain at this time. Dr Tee at bedside. Patient in no acute signs of distress. All needs addressed at this time. Safety and comfort maintained. Will continue to monitor.

## 2020-03-02 NOTE — ED PROVIDER NOTE - CARE PLAN
Principal Discharge DX:	Urinary tract infection without hematuria, site unspecified  Secondary Diagnosis:	Weakness  Secondary Diagnosis:	Slurred speech

## 2020-03-02 NOTE — H&P ADULT - NSICDXPASTSURGICALHX_GEN_ALL_CORE_FT
PAST SURGICAL HISTORY:  H/O left knee surgery     H/O right inguinal hernia repair     History of loop recorder LINQ hear monitor placed

## 2020-03-03 LAB
ANION GAP SERPL CALC-SCNC: 5 MMOL/L — SIGNIFICANT CHANGE UP (ref 5–17)
BUN SERPL-MCNC: 36 MG/DL — HIGH (ref 7–23)
CALCIUM SERPL-MCNC: 8.3 MG/DL — LOW (ref 8.5–10.1)
CHLORIDE SERPL-SCNC: 111 MMOL/L — HIGH (ref 96–108)
CHOLEST SERPL-MCNC: 163 MG/DL — SIGNIFICANT CHANGE UP (ref 10–199)
CO2 SERPL-SCNC: 23 MMOL/L — SIGNIFICANT CHANGE UP (ref 22–31)
CREAT SERPL-MCNC: 2.2 MG/DL — HIGH (ref 0.5–1.3)
CULTURE RESULTS: SIGNIFICANT CHANGE UP
GLUCOSE SERPL-MCNC: 89 MG/DL — SIGNIFICANT CHANGE UP (ref 70–99)
HDLC SERPL-MCNC: 61 MG/DL — SIGNIFICANT CHANGE UP
LIPID PNL WITH DIRECT LDL SERPL: 90 MG/DL — SIGNIFICANT CHANGE UP
POTASSIUM SERPL-MCNC: 3.9 MMOL/L — SIGNIFICANT CHANGE UP (ref 3.5–5.3)
POTASSIUM SERPL-SCNC: 3.9 MMOL/L — SIGNIFICANT CHANGE UP (ref 3.5–5.3)
SODIUM SERPL-SCNC: 139 MMOL/L — SIGNIFICANT CHANGE UP (ref 135–145)
SPECIMEN SOURCE: SIGNIFICANT CHANGE UP
TOTAL CHOLESTEROL/HDL RATIO MEASUREMENT: 2.7 RATIO — LOW (ref 3.4–9.6)
TRIGL SERPL-MCNC: 62 MG/DL — SIGNIFICANT CHANGE UP (ref 10–149)

## 2020-03-03 PROCEDURE — 99233 SBSQ HOSP IP/OBS HIGH 50: CPT

## 2020-03-03 PROCEDURE — 95816 EEG AWAKE AND DROWSY: CPT | Mod: 26

## 2020-03-03 PROCEDURE — 99232 SBSQ HOSP IP/OBS MODERATE 35: CPT

## 2020-03-03 PROCEDURE — 70551 MRI BRAIN STEM W/O DYE: CPT | Mod: 26

## 2020-03-03 RX ORDER — LANOLIN ALCOHOL/MO/W.PET/CERES
5 CREAM (GRAM) TOPICAL AT BEDTIME
Refills: 0 | Status: COMPLETED | OUTPATIENT
Start: 2020-03-03 | End: 2020-03-03

## 2020-03-03 RX ADMIN — ATORVASTATIN CALCIUM 20 MILLIGRAM(S): 80 TABLET, FILM COATED ORAL at 22:23

## 2020-03-03 RX ADMIN — SODIUM CHLORIDE 65 MILLILITER(S): 9 INJECTION INTRAMUSCULAR; INTRAVENOUS; SUBCUTANEOUS at 01:23

## 2020-03-03 RX ADMIN — AMLODIPINE BESYLATE 5 MILLIGRAM(S): 2.5 TABLET ORAL at 18:23

## 2020-03-03 RX ADMIN — CEFTRIAXONE 1000 MILLIGRAM(S): 500 INJECTION, POWDER, FOR SOLUTION INTRAMUSCULAR; INTRAVENOUS at 13:15

## 2020-03-03 RX ADMIN — Medication 81 MILLIGRAM(S): at 13:16

## 2020-03-03 RX ADMIN — Medication 2000 UNIT(S): at 13:16

## 2020-03-03 RX ADMIN — CITALOPRAM 10 MILLIGRAM(S): 10 TABLET, FILM COATED ORAL at 13:30

## 2020-03-03 RX ADMIN — SODIUM CHLORIDE 65 MILLILITER(S): 9 INJECTION INTRAMUSCULAR; INTRAVENOUS; SUBCUTANEOUS at 13:15

## 2020-03-03 RX ADMIN — Medication 5 MILLIGRAM(S): at 22:23

## 2020-03-03 RX ADMIN — HEPARIN SODIUM 5000 UNIT(S): 5000 INJECTION INTRAVENOUS; SUBCUTANEOUS at 06:18

## 2020-03-03 RX ADMIN — HEPARIN SODIUM 5000 UNIT(S): 5000 INJECTION INTRAVENOUS; SUBCUTANEOUS at 18:28

## 2020-03-03 NOTE — PHYSICAL THERAPY INITIAL EVALUATION ADULT - IMPAIRMENTS FOUND, PT EVAL
gait, locomotion, and balance/cognitive impairment/muscle strength/aerobic capacity/endurance/gross motor

## 2020-03-03 NOTE — PROGRESS NOTE ADULT - SUBJECTIVE AND OBJECTIVE BOX
HPI:  Pt is an 86 y/o male with PMHx of dementia, HTN, prostate cancer, anxiety, Hyperlipidemia, arrhythmia, s/p loop recorder, s/p right inguinal hernia repair who presents via EMS with slurred speech and confusion. Pt went to sleep earlier than usual , around 6pm and hence did not take his night time meds at 7pm. He woke at 3am and later went back to sleep.  Pt was noted to be tremulous this morning while eating his oatmeal.    Pt's daughter and granddaughter reported he was slurring his words and speaking slowly this morning.  He had weakness and was unable to walk down the stairs.  His family report he is  more confused than baseline. He has been having twitching of the left eye, which his daughter reports is new.  Admitted for r/o CVA.      3/3:  Pt seen.  No complaints.  Pleasantly confused.      Review of system- All 10 systems reviewed and is as per HPI otherwise negative.     T(C): 36.6 (20 @ 20:51), Max: 36.7 (20 @ 06:15)  HR: 79 (20 @ 20:51) (67 - 81)  BP: 175/44 (20 @ 20:51) (141/42 - 185/58)  RR: 17 (20 @ 20:51) (16 - 18)  SpO2: 97% (20 @ 20:51) (97% - 99%)  Wt(kg): --    PHYSICAL EXAM:  GENERAL: Comfortable, no acute distress  HEAD:  Atraumatic, Normocephalic  EYES: EOMI, PERRLA  HEENT: Moist mucous membranes  NECK: Supple, No JVD  NERVOUS SYSTEM:  Alert & Oriented X3, Motor Strength 5/5 B/L upper and lower extremities  CHEST/LUNG: Clear to auscultation bilaterally  HEART: Regular rate and rhythm; No murmurs, rubs, or gallops  ABDOMEN: Soft, Nontender, Nondistended; Bowel sounds present  GENITOURINARY- Voiding, no palpable bladder  EXTREMITIES:  No clubbing, cyanosis, or edema  MUSCULOSKELTAL- No muscle tenderness, No joint tenderness  SKIN-no rash    LABS:                        11.5   10.66 )-----------( 189      ( 02 Mar 2020 11:34 )             34.9     03-03    139  |  111<H>  |  36<H>  ----------------------------<  89  3.9   |  23  |  2.20<H>    Ca    8.3<L>      03 Mar 2020 09:50    TPro  6.9  /  Alb  3.2<L>  /  TBili  0.7  /  DBili  x   /  AST  19  /  ALT  15  /  AlkPhos  89  03-02    PT/INR - ( 02 Mar 2020 12:24 )   PT: 12.0 sec;   INR: 1.08 ratio         PTT - ( 02 Mar 2020 12:24 )  PTT:26.4 sec  Urinalysis Basic - ( 02 Mar 2020 12:06 )    Color: Yellow / Appearance: Clear / S.015 / pH: x  Gluc: x / Ketone: Negative  / Bili: Negative / Urobili: Negative mg/dL   Blood: x / Protein: 100 mg/dL / Nitrite: Negative   Leuk Esterase: Trace / RBC: 3-5 /HPF / WBC 26-50   Sq Epi: x / Non Sq Epi: Occasional / Bacteria: Few        CAPILLARY BLOOD GLUCOSE          CAPILLARY BLOOD GLUCOSE        CAPILLARY BLOOD GLUCOSE      POCT Blood Glucose.: 89 mg/dL (02 Mar 2020 11:24)      CARDIAC MARKERS ( 02 Mar 2020 14:56 )  <0.015 ng/mL / x     / x     / x     / x      CARDIAC MARKERS ( 02 Mar 2020 11:34 )  <0.015 ng/mL / x     / x     / x     / x            RECENT CULTURES:                MEDS  acetaminophen   Tablet .. 650 milliGRAM(s) Oral every 6 hours PRN  amLODIPine   Tablet 5 milliGRAM(s) Oral daily  aspirin enteric coated 81 milliGRAM(s) Oral daily  atorvastatin 20 milliGRAM(s) Oral at bedtime  cefTRIAXone Injectable. 1000 milliGRAM(s) IV Push every 24 hours  cholecalciferol 2000 Unit(s) Oral daily  citalopram 10 milliGRAM(s) Oral daily  heparin  Injectable 5000 Unit(s) SubCutaneous every 12 hours  sodium chloride 0.9%. 500 milliLiter(s) IV Continuous <Continuous>

## 2020-03-03 NOTE — PROGRESS NOTE ADULT - REASON FOR ADMISSION
Metabolic Encephalopathy  Slurred Speech  UTI  DELMY, Acute on CKD Cr 2.64, last known creat 1.66  Fever

## 2020-03-03 NOTE — PROGRESS NOTE ADULT - ASSESSMENT
88 y/o male with PMHx of dementia, HTN, prostate cancer, anxiety, Hyperlipidemia, arrhythmia, s/p loop recorder, s/p right inguinal hernia repair who presents via EMS with slurred speech and confusion. R/o TIA/ CVA.  Also concern for UTI.      #Slurred Speech/ Confusion:    R/o TIA/ CVA.    MR brain with several old infarcts-- no acute infarcts.    EEG WNL; however, neuro still concerned about possible focal seizures.    Cont IV ABX for UTI.    Neuro considering adding keppra 250 BID if tremors do not improve for concern of focal seizure.      #UTI:    F/u culture.  Cont rocephin.      #Metablic Encephalopathy:    Suspect related to infection.    R/o CVA.    R/o Seizure.    Follow.      #DELMY, Acute on CKDIII Cr 2.64:    Baseline ~1.6.    Suspect prerenal.  Improved to 2.2 with IVF.    Repeat in am.

## 2020-03-03 NOTE — PROGRESS NOTE ADULT - ASSESSMENT
88 yo man with history of HTN, dementia, prostate cancer, arrhythmia s/p catheter ablation who presents with mild confusion, twitching of face and extremities, word finding difficulty.  On exam he is noted to have some semirhythmic twitching of left face as well as intermittent twitching of left arm.  -No epileptiform activity seen on EEG (no clinical twitching noted during EEG).  -MRI brain shows advanced white matter disease with chronic lacunar infarctions but no acute infarctions.  -Continue aspirin  -Continue atorvastatin   -Symptoms may be related to UTI. If no improvement in symptoms after another day of antibiotics will consider low dose Keppra for possible focal seizures.

## 2020-03-03 NOTE — PHYSICAL THERAPY INITIAL EVALUATION ADULT - CRITERIA FOR SKILLED THERAPEUTIC INTERVENTIONS
functional limitations in following categories/risk reduction/prevention/rehab potential/therapy frequency/anticipated equipment needs at discharge/anticipated discharge recommendation/impairments found/predicted duration of therapy intervention

## 2020-03-03 NOTE — PHYSICAL THERAPY INITIAL EVALUATION ADULT - FOLLOWS COMMANDS/ANSWERS QUESTIONS, REHAB EVAL
Pt is mostly [place] speaking, understands little english, Pt  was unable to use [place] /50% of the time/unable to follow multi-step instructions/able to follow single-step instructions

## 2020-03-03 NOTE — PROGRESS NOTE ADULT - SUBJECTIVE AND OBJECTIVE BOX
Interval History:  3/3/20: No new complaints today.     MEDICATIONS  (STANDING):  amLODIPine   Tablet 5 milliGRAM(s) Oral daily  aspirin enteric coated 81 milliGRAM(s) Oral daily  atorvastatin 20 milliGRAM(s) Oral at bedtime  cefTRIAXone Injectable. 1000 milliGRAM(s) IV Push every 24 hours  cholecalciferol 2000 Unit(s) Oral daily  citalopram 10 milliGRAM(s) Oral daily  heparin  Injectable 5000 Unit(s) SubCutaneous every 12 hours  sodium chloride 0.9%. 500 milliLiter(s) (65 mL/Hr) IV Continuous <Continuous>    MEDICATIONS  (PRN):  acetaminophen   Tablet .. 650 milliGRAM(s) Oral every 6 hours PRN Temp greater or equal to 38.5C (101.3F), Moderate Pain (4 - 6)      Allergies    No Known Allergies    Intolerances        PHYSICAL EXAM:  Vital Signs Last 24 Hrs  T(F): 98.1 (20 @ 10:52)  HR: 67 (20 @ 10:52)  BP: 185/58 (20 @ 10:52)  RR: 18 (20 @ 10:52)    GENERAL: NAD, well-groomed, well-developed  HEAD:  Atraumatic, Normocephalic  Neuro:  Awake, alert  CN: PERRL, EOMI, no nystagmus, no facial weakness, no significant dysarthria, tongue protrudes in the midline  motor: normal tone, no pronator drift, full strength in all four extremities  sensory: intact to light touch  coordination: finger to nose intact bilaterally  Some left facial twitching noted.       LABS:                        11.5   10.66 )-----------( 189      ( 02 Mar 2020 11:34 )             34.9     -    139  |  111<H>  |  36<H>  ----------------------------<  89  3.9   |  23  |  2.20<H>    Ca    8.3<L>      03 Mar 2020 09:50    TPro  6.9  /  Alb  3.2<L>  /  TBili  0.7  /  DBili  x   /  AST  19  /  ALT  15  /  AlkPhos  89  03-02    PT/INR - ( 02 Mar 2020 12:24 )   PT: 12.0 sec;   INR: 1.08 ratio         PTT - ( 02 Mar 2020 12:24 )  PTT:26.4 sec  Urinalysis Basic - ( 02 Mar 2020 12:06 )    Color: Yellow / Appearance: Clear / S.015 / pH: x  Gluc: x / Ketone: Negative  / Bili: Negative / Urobili: Negative mg/dL   Blood: x / Protein: 100 mg/dL / Nitrite: Negative   Leuk Esterase: Trace / RBC: 3-5 /HPF / WBC 26-50   Sq Epi: x / Non Sq Epi: Occasional / Bacteria: Few        RADIOLOGY & ADDITIONAL STUDIES:      CT head no contrast 3/2/20:  Moderate periventricular and deep white matter ischemia with old lacunar infarctions in the BILATERAL basal ganglia.    EEG 3/3/20: Mild generalized slowing    MRI head no contrast 3/3/20:  advanced periventricular and deep white matter ischemia with multiple old lacunar infarctions in the BILATERAL basal ganglia and thalami. Minimal pontine white matter ischemia is noted.

## 2020-03-03 NOTE — PHYSICAL THERAPY INITIAL EVALUATION ADULT - PERTINENT HX OF CURRENT PROBLEM, REHAB EVAL
Pt presents via EMS with slurred speech and confusion. Pt was noted to be tremulous this morning, Pt's daughter and granddaughter reported he was slurring his words and speaking slowly this morning.  He had weakness and was unable to walk down the stairs.  His family report he is  more confused than baseline. He has been having twitching of the left eye, His daughter reports pt had dysuria when he was urinating earlier in the ED.

## 2020-03-04 ENCOUNTER — TRANSCRIPTION ENCOUNTER (OUTPATIENT)
Age: 85
End: 2020-03-04

## 2020-03-04 LAB
ANION GAP SERPL CALC-SCNC: 6 MMOL/L — SIGNIFICANT CHANGE UP (ref 5–17)
BUN SERPL-MCNC: 37 MG/DL — HIGH (ref 7–23)
CALCIUM SERPL-MCNC: 8.6 MG/DL — SIGNIFICANT CHANGE UP (ref 8.5–10.1)
CHLORIDE SERPL-SCNC: 112 MMOL/L — HIGH (ref 96–108)
CO2 SERPL-SCNC: 23 MMOL/L — SIGNIFICANT CHANGE UP (ref 22–31)
CREAT SERPL-MCNC: 2.17 MG/DL — HIGH (ref 0.5–1.3)
GLUCOSE SERPL-MCNC: 85 MG/DL — SIGNIFICANT CHANGE UP (ref 70–99)
POTASSIUM SERPL-MCNC: 4.1 MMOL/L — SIGNIFICANT CHANGE UP (ref 3.5–5.3)
POTASSIUM SERPL-SCNC: 4.1 MMOL/L — SIGNIFICANT CHANGE UP (ref 3.5–5.3)
SODIUM SERPL-SCNC: 141 MMOL/L — SIGNIFICANT CHANGE UP (ref 135–145)

## 2020-03-04 PROCEDURE — 99239 HOSP IP/OBS DSCHRG MGMT >30: CPT

## 2020-03-04 PROCEDURE — 99232 SBSQ HOSP IP/OBS MODERATE 35: CPT

## 2020-03-04 RX ORDER — CEFUROXIME AXETIL 250 MG
1 TABLET ORAL
Qty: 10 | Refills: 0
Start: 2020-03-04 | End: 2020-03-08

## 2020-03-04 RX ADMIN — CITALOPRAM 10 MILLIGRAM(S): 10 TABLET, FILM COATED ORAL at 14:12

## 2020-03-04 RX ADMIN — HEPARIN SODIUM 5000 UNIT(S): 5000 INJECTION INTRAVENOUS; SUBCUTANEOUS at 19:00

## 2020-03-04 RX ADMIN — AMLODIPINE BESYLATE 5 MILLIGRAM(S): 2.5 TABLET ORAL at 19:00

## 2020-03-04 RX ADMIN — Medication 2000 UNIT(S): at 14:11

## 2020-03-04 RX ADMIN — CEFTRIAXONE 1000 MILLIGRAM(S): 500 INJECTION, POWDER, FOR SOLUTION INTRAMUSCULAR; INTRAVENOUS at 14:11

## 2020-03-04 RX ADMIN — HEPARIN SODIUM 5000 UNIT(S): 5000 INJECTION INTRAVENOUS; SUBCUTANEOUS at 06:35

## 2020-03-04 RX ADMIN — ATORVASTATIN CALCIUM 20 MILLIGRAM(S): 80 TABLET, FILM COATED ORAL at 21:32

## 2020-03-04 RX ADMIN — Medication 81 MILLIGRAM(S): at 14:12

## 2020-03-04 NOTE — DISCHARGE NOTE PROVIDER - CARE PROVIDER_API CALL
Ben Hernández)  Family Medicine  [address] [place] [address]  Phone: [phone]  Fax: [phone]  Follow Up Time: 1 week    Kristen Jean)  Clinical Neurophysiology; EEGEpilepsy; Neurology; Sleep Medicine  [address] [facility], [address]  [place] [address]  Phone: [phone]  Fax: [phone]  Follow Up Time: 2 weeks

## 2020-03-04 NOTE — PROGRESS NOTE ADULT - ASSESSMENT
88 yo man with history of HTN, dementia, prostate cancer, arrhythmia s/p catheter ablation who presents with mild confusion, twitching of face and extremities, word finding difficulty.  On exam he is noted to have some semirhythmic twitching of left face as well as intermittent twitching of left arm.  -No epileptiform activity seen on EEG (no clinical twitching noted during EEG).  -MRI brain shows advanced white matter disease with chronic lacunar infarctions but no acute infarctions.  -Continue aspirin  -Continue atorvastatin   -Symptoms likely related to UTI. No twitching noted this afternoon. If this returns patient can f/u for additional testing.

## 2020-03-04 NOTE — DISCHARGE NOTE PROVIDER - CARE PROVIDERS DIRECT ADDRESSES
,DirectAddress_Unknown,[email or web address]

## 2020-03-04 NOTE — DISCHARGE NOTE PROVIDER - NSDCMRMEDTOKEN_GEN_ALL_CORE_FT
amLODIPine 5 mg oral tablet: 1 tab(s) orally once a day  Aspir 81 oral delayed release tablet: 1 tab(s) orally once a day  cefuroxime 250 mg oral tablet: 1 tab(s) orally 2 times a day   CeleXA 10 mg oral tablet: 1 tab(s) orally once a day  Lipitor 10 mg oral tablet: 1 tab(s) orally once a day  Vitamin D3 2000 intl units (50 mcg) oral tablet: 1 tab(s) orally once a day

## 2020-03-04 NOTE — DISCHARGE NOTE PROVIDER - PROVIDER TOKENS
PROVIDER:[TOKEN:[[id number]],FOLLOWUP:[1 week]],PROVIDER:[TOKEN:[[id number]],FOLLOWUP:[2 weeks]]

## 2020-03-04 NOTE — DISCHARGE NOTE PROVIDER - HOSPITAL COURSE
86 y/o male with PMHx of dementia, HTN, prostate cancer, anxiety, hyperlipidemia, arrhythmia, s/p loop recorder, s/p right inguinal hernia repair who presents via EMS with slurred speech and confusion. Pt went to sleep earlier than usual , around 6pm and hence did not take his night time meds at 7pm. He woke at 3am and later went back to sleep.  Pt was noted to be tremulous this morning while eating his oatmeal.    Pt's daughter and granddaughter reported he was slurring his words and speaking slowly this morning.  He had weakness and was unable to walk down the stairs.  His family report he is  more confused than baseline. He has been having twitching of the left eye, which his daughter reports is new.  Admitted for r/o CVA.          Pt admitted.  Had EEG and MRI which where negative for seizure or acute CVA.  MRI did show evidence of old CVA.  Patient also found to have dehydration and UTI.  Improved on IV ABX and IV fluids.  Now back to baseline per family.  Walked 200 ft with PT.  Stable for d/c home.  Pt was also having some twitches-- EEG negative for seizure but cannot r/o focal seizure.  As per neuro-- f/u if twitches continue for further work up.          Vital Signs Last 24 Hrs    T(C): 36.6 (04 Mar 2020 10:10), Max: 37.1 (04 Mar 2020 05:36)    T(F): 97.9 (04 Mar 2020 10:10), Max: 98.7 (04 Mar 2020 05:36)    HR: 60 (04 Mar 2020 10:10) (60 - 81)    BP: 141/50 (04 Mar 2020 10:10) (141/50 - 175/44)    BP(mean): --    RR: 17 (04 Mar 2020 10:10) (17 - 18)    SpO2: 98% (04 Mar 2020 10:10) (97% - 98%)        PHYSICAL EXAM:    GENERAL: Comfortable, no acute distress    HEAD:  Atraumatic, Normocephalic    EYES: EOMI, PERRLA    HEENT: Moist mucous membranes    NECK: Supple, No JVD    NERVOUS SYSTEM:  Alert & Oriented X3, Motor Strength 5/5 B/L upper and lower extremities    CHEST/LUNG: Clear to auscultation bilaterally    HEART: Regular rate and rhythm; No murmurs, rubs, or gallops    ABDOMEN: Soft, Nontender, Nondistended; Bowel sounds present    GENITOURINARY- Voiding, no palpable bladder    EXTREMITIES:  No clubbing, cyanosis, or edema    MUSCULOSKELTAL- No muscle tenderness, No joint tenderness    SKIN-no rash        03-04        141  |  112<H>  |  37<H>    ----------------------------<  85    4.1   |  23  |  2.17<H>        Ca    8.6      04 Mar 2020 07:28            #Slurred Speech/ Confusion:      EEG/ MRI negative for acute seizure/ stroke.      Appreciate neuro f/u.      Suspect neuro sx related to dehydration/ UTI.      As per daughter-- appears back to baseline.          #UTI:    S/p rocephin-- ceftin for 5 days @ d/c.      Culture with 3 organisms.          #Metablic Encephalopathy:      Resolved.  Related to UTI/ dehydration.          #DELMY, Acute on CKDIII Cr 2.64:      Improved to 2.1.      Encourage oral intake.              Stable for d/c home with home care.      Daughter request to  in am.      Total time spent 45 min.      [email or web address].

## 2020-03-04 NOTE — PROGRESS NOTE ADULT - SUBJECTIVE AND OBJECTIVE BOX
Interval History:  3/4/20: No new complaints today. Less twitching noted of left face. None noted on my exam but RN noted some earlier today.    MEDICATIONS  (STANDING):  amLODIPine   Tablet 5 milliGRAM(s) Oral daily  aspirin enteric coated 81 milliGRAM(s) Oral daily  atorvastatin 20 milliGRAM(s) Oral at bedtime  cefTRIAXone Injectable. 1000 milliGRAM(s) IV Push every 24 hours  cholecalciferol 2000 Unit(s) Oral daily  citalopram 10 milliGRAM(s) Oral daily  heparin  Injectable 5000 Unit(s) SubCutaneous every 12 hours    MEDICATIONS  (PRN):  acetaminophen   Tablet .. 650 milliGRAM(s) Oral every 6 hours PRN Temp greater or equal to 38.5C (101.3F), Moderate Pain (4 - 6)      Allergies    No Known Allergies    Intolerances        PHYSICAL EXAM:  Vital Signs Last 24 Hrs  T(F): 97.9 (03-04-20 @ 10:10)  HR: 60 (03-04-20 @ 10:10)  BP: 141/50 (03-04-20 @ 10:10)  RR: 17 (03-04-20 @ 10:10)    GENERAL: NAD, well-groomed, well-developed  HEAD:  Atraumatic, Normocephalic  Neuro:  Awake, alert, no aphasia  CN: PERRL, EOMI, no nystagmus, no facial weakness, tongue protrudes in the midline  motor: normal tone, no pronator drift, full strength in all four extremities  sensory: intact to light touch  coordination: finger to nose intact bilaterally      LABS:    03-04    141  |  112<H>  |  37<H>  ----------------------------<  85  4.1   |  23  |  2.17<H>    Ca    8.6      04 Mar 2020 07:28            Radiology:      CT head no contrast 3/2/20:  Moderate periventricular and deep white matter ischemia with old lacunar infarctions in the BILATERAL basal ganglia.    EEG 3/3/20: Mild generalized slowing    MRI head no contrast 3/3/20:  advanced periventricular and deep white matter ischemia with multiple old lacunar infarctions in the BILATERAL basal ganglia and thalami. Minimal pontine white matter ischemia is noted.

## 2020-03-04 NOTE — DISCHARGE NOTE PROVIDER - NSDCCPCAREPLAN_GEN_ALL_CORE_FT
PRINCIPAL DISCHARGE DIAGNOSIS  Diagnosis: Urinary tract infection without hematuria, site unspecified  Assessment and Plan of Treatment: complete antibiotics      SECONDARY DISCHARGE DIAGNOSES  Diagnosis: Slurred speech  Assessment and Plan of Treatment: likely related to dehydration/ UTI.  no evidence of CVA.  possible TIA

## 2020-03-05 ENCOUNTER — TRANSCRIPTION ENCOUNTER (OUTPATIENT)
Age: 85
End: 2020-03-05

## 2020-03-05 VITALS
HEART RATE: 77 BPM | SYSTOLIC BLOOD PRESSURE: 153 MMHG | TEMPERATURE: 97 F | OXYGEN SATURATION: 97 % | DIASTOLIC BLOOD PRESSURE: 49 MMHG | RESPIRATION RATE: 18 BRPM

## 2020-03-05 RX ADMIN — HEPARIN SODIUM 5000 UNIT(S): 5000 INJECTION INTRAVENOUS; SUBCUTANEOUS at 06:05

## 2020-03-05 NOTE — DISCHARGE NOTE NURSING/CASE MANAGEMENT/SOCIAL WORK - PATIENT PORTAL LINK FT
You can access the FollowMyHealth Patient Portal offered by [facility] by registering at the following website: [email or web address]. By joining [facility]’s FollowMyHealth portal, you will also be able to view your health information using other applications (apps) compatible with our system.

## 2020-03-07 LAB
CULTURE RESULTS: SIGNIFICANT CHANGE UP
CULTURE RESULTS: SIGNIFICANT CHANGE UP
SPECIMEN SOURCE: SIGNIFICANT CHANGE UP
SPECIMEN SOURCE: SIGNIFICANT CHANGE UP

## 2020-03-10 DIAGNOSIS — C61 MALIGNANT NEOPLASM OF PROSTATE: ICD-10-CM

## 2020-03-10 DIAGNOSIS — N39.0 URINARY TRACT INFECTION, SITE NOT SPECIFIED: ICD-10-CM

## 2020-03-10 DIAGNOSIS — G93.41 METABOLIC ENCEPHALOPATHY: ICD-10-CM

## 2020-03-10 DIAGNOSIS — Z79.899 OTHER LONG TERM (CURRENT) DRUG THERAPY: ICD-10-CM

## 2020-03-10 DIAGNOSIS — N18.3 CHRONIC KIDNEY DISEASE, STAGE 3 (MODERATE): ICD-10-CM

## 2020-03-10 DIAGNOSIS — Z98.890 OTHER SPECIFIED POSTPROCEDURAL STATES: ICD-10-CM

## 2020-03-10 DIAGNOSIS — I12.9 HYPERTENSIVE CHRONIC KIDNEY DISEASE WITH STAGE 1 THROUGH STAGE 4 CHRONIC KIDNEY DISEASE, OR UNSPECIFIED CHRONIC KIDNEY DISEASE: ICD-10-CM

## 2020-03-10 DIAGNOSIS — Z87.891 PERSONAL HISTORY OF NICOTINE DEPENDENCE: ICD-10-CM

## 2020-03-10 DIAGNOSIS — F03.90 UNSPECIFIED DEMENTIA WITHOUT BEHAVIORAL DISTURBANCE: ICD-10-CM

## 2020-03-10 DIAGNOSIS — E86.0 DEHYDRATION: ICD-10-CM

## 2020-03-10 DIAGNOSIS — E78.00 PURE HYPERCHOLESTEROLEMIA, UNSPECIFIED: ICD-10-CM

## 2020-03-10 DIAGNOSIS — F41.9 ANXIETY DISORDER, UNSPECIFIED: ICD-10-CM

## 2020-03-10 DIAGNOSIS — Z86.73 PERSONAL HISTORY OF TRANSIENT ISCHEMIC ATTACK (TIA), AND CEREBRAL INFARCTION WITHOUT RESIDUAL DEFICITS: ICD-10-CM

## 2020-03-10 DIAGNOSIS — Z79.82 LONG TERM (CURRENT) USE OF ASPIRIN: ICD-10-CM

## 2020-03-10 DIAGNOSIS — N17.9 ACUTE KIDNEY FAILURE, UNSPECIFIED: ICD-10-CM

## 2020-03-26 ENCOUNTER — APPOINTMENT (OUTPATIENT)
Dept: NEUROLOGY | Facility: CLINIC | Age: 85
End: 2020-03-26

## 2020-06-04 NOTE — H&P ADULT - RS GEN PE MLT RESP DETAILS PC
[FreeTextEntry1] : Tdap administered to Left deltoid; no adverse reaction noted.  clear to auscultation bilaterally

## 2021-01-28 ENCOUNTER — TRANSCRIPTION ENCOUNTER (OUTPATIENT)
Age: 86
End: 2021-01-28

## 2021-01-28 ENCOUNTER — APPOINTMENT (OUTPATIENT)
Dept: NEUROLOGY | Facility: CLINIC | Age: 86
End: 2021-01-28
Payer: MEDICARE

## 2021-01-28 VITALS — TEMPERATURE: 97.8 F

## 2021-01-28 PROCEDURE — 99213 OFFICE O/P EST LOW 20 MIN: CPT

## 2021-01-28 RX ORDER — CITALOPRAM 10 MG/1
10 TABLET, FILM COATED ORAL
Refills: 0 | Status: ACTIVE | COMMUNITY

## 2021-01-28 RX ORDER — ATORVASTATIN CALCIUM 10 MG/1
10 TABLET, FILM COATED ORAL
Refills: 0 | Status: ACTIVE | COMMUNITY

## 2021-01-28 RX ORDER — AMLODIPINE BESYLATE 10 MG/1
10 TABLET ORAL
Refills: 0 | Status: ACTIVE | COMMUNITY

## 2021-01-28 NOTE — REVIEW OF SYSTEMS
[Feeling Tired] : feeling tired [Anxiety] : anxiety [Confused or Disoriented] : confusion [Memory Lapses or Loss] : memory loss [Decr. Concentrating Ability] : decreased concentrating ability [Changed Thought Patterns] : changed thought patterns [Repeating Questions] : repeated questioning about recent events [Arthralgias] : arthralgias [Joint Stiffness] : joint stiffness [Negative] : Endocrine

## 2021-01-28 NOTE — DISCUSSION/SUMMARY
[FreeTextEntry1] : 88 year old man hx of forgetfulness, disorientation,has tried donepezil and Razadyne in the past, d/c due to diarrhea.\par Plan: start titrating Namenda 5 mg po QD, increase q weekly by 5 mg till 10 mg BID\par RTO 1 month.

## 2021-01-28 NOTE — PHYSICAL EXAM
[General Appearance - Alert] : alert [General Appearance - In No Acute Distress] : in no acute distress [Person] : oriented to person [Place] : disoriented to place [Time] : disoriented to time [Remote Intact] : remote memory impaired [Span Intact] : the attention span was normal [Concentration Intact] : a decrease in concentrating ability was observed [Visual Intact] : visual attention was ~T ~L decreased [Naming Objects] : no difficulty naming common objects [Repeating Phrases] : no difficulty repeating a phrase [Fluency] : fluency intact [Comprehension] : comprehension intact [Reading] : difficulty reading [Current Events] : inadequate knowledge of current events [Cranial Nerves Oculomotor (III)] : extraocular motion intact [Cranial Nerves Optic (II)] : visual acuity intact bilaterally,  visual fields full to confrontation, pupils equal round and reactive to light [Cranial Nerves Trigeminal (V)] : facial sensation intact symmetrically [Cranial Nerves Facial (VII)] : face symmetrical [Cranial Nerves Vestibulocochlear (VIII)] : hearing was intact bilaterally [Cranial Nerves Glossopharyngeal (IX)] : tongue and palate midline [Cranial Nerves Hypoglossal (XII)] : there was no tongue deviation with protrusion [Cranial Nerves Accessory (XI - Cranial And Spinal)] : head turning and shoulder shrug symmetric [Motor Strength] : muscle strength was normal in all four extremities [Motor Handedness Right-Handed] : the patient is right hand dominant [Paresis Pronator Drift Right-Sided] : no pronator drift on the right [Paresis Pronator Drift Left-Sided] : no pronator drift on the left [Sensation Tactile Decrease] : light touch was intact [Tremor] : a tremor present [Dysdiadochokinesia Bilaterally] : not present [Coordination - Dysmetria Impaired Finger-to-Nose Bilateral] : not present [1+] : Patella left 1+ [FreeTextEntry8] : hesitant [Neck Appearance] : the appearance of the neck was normal [Neck Cervical Mass (___cm)] : no neck mass was observed [] : no respiratory distress [Respiration, Rhythm And Depth] : normal respiratory rhythm and effort [Arterial Pulses Carotid] : carotid pulses were normal with no bruits [Edema] : there was no peripheral edema [No Spinal Tenderness] : no spinal tenderness

## 2021-01-28 NOTE — HISTORY OF PRESENT ILLNESS
[FreeTextEntry1] : 88 year old man hx of depression, dementia, was in , 3/2020, admitted with UTI, change in MS. Had CT/MR head, EEG. No evidence of stroke, seizures.  Progressive forgetfulness, memory problems, at times anxious.\par previous MRI and 20/20, demonstrated bilateral subcortical ischemic changes.Lacunar infarct. An electroencephalograph at that time it had in the hospital, was mildly slow. No seizure activity noted.\par Patient lives with daughter, semi-independent for activities of daily living. He some help dressing, toilet himself, can lower the bathroom. He does no cooking or no other activities of the day at home. Pretty sedentary life. No recent falls, denies chest pain or palpitations. No change in bowel habits. Usually has good sleep habits.Denies agitation or hallucinations.\par Complains of occasional headaches,daughter report very twice a week, sometimes less, mild to moderate intensity, bulk, and over the right occiput. Treated with naproxen, usually with resolution.

## 2021-01-28 NOTE — DATA REVIEWED
[de-identified] :  EXAM:  MR BRAIN                      \par \par \par PROCEDURE DATE:  03/03/2020  \par \par \par \par INTERPRETATION:  \par MR brain  without gadolinium \par \par CLINICAL INFORMATION: Fever\par \par TECHNIQUE:   Sagittal and axial T1-weighted images, axial FLAIR images, axial gradient echo and T2-weighted images and axial diffusion weighted images of the brain were obtained.\par \par FINDINGS:   No prior similar studies are available for review.\par \par The brain demonstrates advanced periventricular and deep white matter ischemia with multiple old lacunar infarctions in the BILATERAL basal ganglia and thalami. Minimal pontine white matter ischemia is noted.   No acute cerebral cortical infarct is found.   No intracranial hemorrhage is recognized. No mass effect is found in the brain.\par \par The ventricles, sulci and basal cisterns appear unremarkable.\par \par The vertebral and internal carotid arteries demonstrate expected flow voids indicating their patency.\par \par The orbits are unremarkable.  The paranasal sinuses are clear.  The nasal cavity appears intact.  The nasopharynx is symmetric.  The central skull base and temporal bones are intact.  The calvarium appears unremarkable.\par \par IMPRESSION:   advanced periventricular and deep white matter ischemia with multiple old lacunar infarctions in the BILATERAL basal ganglia and thalami. Minimal pontine white matter ischemia is noted.\par \par \par \par

## 2021-03-01 ENCOUNTER — APPOINTMENT (OUTPATIENT)
Dept: NEUROLOGY | Facility: CLINIC | Age: 86
End: 2021-03-01
Payer: MEDICARE

## 2021-03-01 VITALS — HEIGHT: 67 IN

## 2021-03-01 VITALS — DIASTOLIC BLOOD PRESSURE: 86 MMHG | SYSTOLIC BLOOD PRESSURE: 134 MMHG | HEART RATE: 84 BPM | TEMPERATURE: 97.8 F

## 2021-03-01 PROCEDURE — 99214 OFFICE O/P EST MOD 30 MIN: CPT

## 2021-03-01 NOTE — REVIEW OF SYSTEMS
[Sleep Disturbances] : sleep disturbances [Confused or Disoriented] : confusion [Memory Lapses or Loss] : memory loss [Decr. Concentrating Ability] : decreased concentrating ability [Difficulty with Language] : no ~M difficulty with language [Changed Thought Patterns] : changed thought patterns [Repeating Questions] : repeated questioning about recent events [Arm Weakness] : no arm weakness [Hand Weakness] : no hand weakness [Leg Weakness] : no leg weakness [Poor Coordination] : good coordination [Difficulties in Speech] : no speech difficulties [Abnormal Sensation] : no abnormal sensation [Seizures] : no convulsions [Dizziness] : no dizziness [Migraine Headache] : no migraine headache [Frequent Falls] : not falling [Negative] : Heme/Lymph

## 2021-03-01 NOTE — DISCUSSION/SUMMARY
[FreeTextEntry1] : 88-year-old man with history of dementia, fibrous type, report some transient Hallucinations with Namenda 15 mg p.o. total daily dose, history of renal insufficiency, moderate to severe impairment. \par Reviewed dosing, we'll drop to Namenda 5 mg twice a day. Discussed possible side effects.\par

## 2021-03-01 NOTE — PHYSICAL EXAM
[General Appearance - Alert] : alert [General Appearance - In No Acute Distress] : in no acute distress [Person] : oriented to person [Place] : disoriented to place [Time] : disoriented to time [Remote Intact] : remote memory impaired [Span Intact] : the attention span was normal [Concentration Intact] : a decrease in concentrating ability was observed [Visual Intact] : visual attention was ~T ~L decreased [Naming Objects] : no difficulty naming common objects [Repeating Phrases] : no difficulty repeating a phrase [Fluency] : fluency intact [Comprehension] : comprehension intact [Reading] : difficulty reading [Current Events] : inadequate knowledge of current events [Cranial Nerves Optic (II)] : visual acuity intact bilaterally,  visual fields full to confrontation, pupils equal round and reactive to light [Cranial Nerves Oculomotor (III)] : extraocular motion intact [Cranial Nerves Trigeminal (V)] : facial sensation intact symmetrically [Cranial Nerves Facial (VII)] : face symmetrical [Cranial Nerves Vestibulocochlear (VIII)] : hearing was intact bilaterally [Cranial Nerves Glossopharyngeal (IX)] : tongue and palate midline [Cranial Nerves Accessory (XI - Cranial And Spinal)] : head turning and shoulder shrug symmetric [Cranial Nerves Hypoglossal (XII)] : there was no tongue deviation with protrusion [Motor Strength] : muscle strength was normal in all four extremities [Motor Handedness Right-Handed] : the patient is right hand dominant [Dysdiadochokinesia Bilaterally] : not present [FreeTextEntry8] : hesitant, slightly wide-based gait

## 2021-03-01 NOTE — HISTORY OF PRESENT ILLNESS
[FreeTextEntry1] : 88-year-old man right-handed history of dementia, accompanied by his daughter. Last visit prescribed Namenda titration, but reports when she got to 50 mg daily, noted a few times of hallucinations, hearing voices a workday. She dropped a dose of 5 mg twice a day. Since then, no further problems.\par Remains forgetful, inattentive, passive, test to sit, watch TV. No depression, no recent falls. No recent illness.\par has had difficulty tolerating cholinesterase inhibitors, omeprazole, Dilantin name. History of chronic renal insufficiency, not on hemodialysis. Daughter brings reason reports blot work, with a creatinine clearance of 19.

## 2021-12-13 ENCOUNTER — RX RENEWAL (OUTPATIENT)
Age: 86
End: 2021-12-13

## 2022-04-01 NOTE — H&P ADULT - GASTROINTESTINAL
detailed exam Clindamycin Pregnancy And Lactation Text: This medication can be used in pregnancy if certain situations. Clindamycin is also present in breast milk.

## 2022-04-04 NOTE — ED PROVIDER NOTE - NS_EDPROVIDERDISPOUSERTYPE_ED_A_ED
Patient notified and verbalized understanding Scribe Attestation (For Scribes USE Only)... Scribe Attestation (For Scribes USE Only).../Attending Attestation (For Attendings USE Only)...

## 2022-06-04 NOTE — H&P PST ADULT - PHONE #
Ongoing SW/CM Assessment/Plan of Care Note     See SW/CM flowsheets for goals and other objective data.    Disposition:  HOME    Progress note:   Received call from RN asking for ambulance to be set up to take pt to daughter's home, address:  [address] [place] [address], [address]  [place] [address]    TRANSPORTATION NOTE    Transportation mode: Ambulance  Name of transportation service: [facility]  Phone number:   Transportation scheduled for (date/time): 6/4 at 5PM  Transportation confirmed with: RN, pt daughter     [phone]

## 2023-01-01 ENCOUNTER — TRANSCRIPTION ENCOUNTER (OUTPATIENT)
Age: 88
End: 2023-01-01

## 2023-01-01 ENCOUNTER — INPATIENT (INPATIENT)
Facility: HOSPITAL | Age: 88
LOS: 1 days | Discharge: HOSPICE | DRG: 280 | End: 2023-08-21
Attending: STUDENT IN AN ORGANIZED HEALTH CARE EDUCATION/TRAINING PROGRAM | Admitting: FAMILY MEDICINE
Payer: MEDICARE

## 2023-01-01 ENCOUNTER — APPOINTMENT (OUTPATIENT)
Dept: HOME HEALTH SERVICES | Facility: HOME HEALTH | Age: 88
End: 2023-01-01
Payer: MEDICARE

## 2023-01-01 ENCOUNTER — NON-APPOINTMENT (OUTPATIENT)
Age: 88
End: 2023-01-01

## 2023-01-01 ENCOUNTER — EMERGENCY (EMERGENCY)
Facility: HOSPITAL | Age: 88
LOS: 0 days | Discharge: HOME | End: 2023-07-08
Attending: EMERGENCY MEDICINE
Payer: MEDICARE

## 2023-01-01 ENCOUNTER — INPATIENT (INPATIENT)
Facility: HOSPITAL | Age: 88
LOS: 2 days | Discharge: NURSING, REHAB OR LONG-TERM CARE | DRG: 682 | End: 2023-03-09
Attending: INTERNAL MEDICINE | Admitting: FAMILY MEDICINE
Payer: MEDICARE

## 2023-01-01 VITALS
HEART RATE: 70 BPM | TEMPERATURE: 98 F | OXYGEN SATURATION: 98 % | DIASTOLIC BLOOD PRESSURE: 89 MMHG | RESPIRATION RATE: 18 BRPM | SYSTOLIC BLOOD PRESSURE: 153 MMHG

## 2023-01-01 VITALS
DIASTOLIC BLOOD PRESSURE: 57 MMHG | SYSTOLIC BLOOD PRESSURE: 147 MMHG | WEIGHT: 169.98 LBS | HEIGHT: 68 IN | OXYGEN SATURATION: 98 % | TEMPERATURE: 98 F | HEART RATE: 64 BPM | RESPIRATION RATE: 18 BRPM

## 2023-01-01 VITALS
SYSTOLIC BLOOD PRESSURE: 150 MMHG | HEIGHT: 66 IN | BODY MASS INDEX: 20.89 KG/M2 | TEMPERATURE: 97 F | DIASTOLIC BLOOD PRESSURE: 90 MMHG | OXYGEN SATURATION: 95 % | HEART RATE: 60 BPM | WEIGHT: 130 LBS

## 2023-01-01 VITALS
HEIGHT: 67 IN | SYSTOLIC BLOOD PRESSURE: 147 MMHG | TEMPERATURE: 98 F | DIASTOLIC BLOOD PRESSURE: 52 MMHG | RESPIRATION RATE: 20 BRPM | HEART RATE: 73 BPM | OXYGEN SATURATION: 89 %

## 2023-01-01 VITALS — HEART RATE: 66 BPM | DIASTOLIC BLOOD PRESSURE: 39 MMHG | SYSTOLIC BLOOD PRESSURE: 152 MMHG

## 2023-01-01 VITALS
OXYGEN SATURATION: 100 % | TEMPERATURE: 97 F | SYSTOLIC BLOOD PRESSURE: 157 MMHG | HEART RATE: 66 BPM | DIASTOLIC BLOOD PRESSURE: 48 MMHG | RESPIRATION RATE: 18 BRPM

## 2023-01-01 VITALS
OXYGEN SATURATION: 96 % | RESPIRATION RATE: 17 BRPM | TEMPERATURE: 98 F | SYSTOLIC BLOOD PRESSURE: 151 MMHG | DIASTOLIC BLOOD PRESSURE: 47 MMHG | HEIGHT: 67 IN | WEIGHT: 160.06 LBS | HEART RATE: 64 BPM

## 2023-01-01 DIAGNOSIS — M25.551 PAIN IN RIGHT HIP: ICD-10-CM

## 2023-01-01 DIAGNOSIS — F03.90 UNSPECIFIED DEMENTIA WITHOUT BEHAVIORAL DISTURBANCE: ICD-10-CM

## 2023-01-01 DIAGNOSIS — Z23 ENCOUNTER FOR IMMUNIZATION: ICD-10-CM

## 2023-01-01 DIAGNOSIS — R53.1 WEAKNESS: ICD-10-CM

## 2023-01-01 DIAGNOSIS — F41.9 ANXIETY DISORDER, UNSPECIFIED: ICD-10-CM

## 2023-01-01 DIAGNOSIS — Z98.890 OTHER SPECIFIED POSTPROCEDURAL STATES: Chronic | ICD-10-CM

## 2023-01-01 DIAGNOSIS — N18.5 CHRONIC KIDNEY DISEASE, STAGE 5: ICD-10-CM

## 2023-01-01 DIAGNOSIS — Z66 DO NOT RESUSCITATE: ICD-10-CM

## 2023-01-01 DIAGNOSIS — E86.0 DEHYDRATION: ICD-10-CM

## 2023-01-01 DIAGNOSIS — N18.4 CHRONIC KIDNEY DISEASE, STAGE 4 (SEVERE): ICD-10-CM

## 2023-01-01 DIAGNOSIS — E78.00 PURE HYPERCHOLESTEROLEMIA, UNSPECIFIED: ICD-10-CM

## 2023-01-01 DIAGNOSIS — S00.81XA ABRASION OF OTHER PART OF HEAD, INITIAL ENCOUNTER: ICD-10-CM

## 2023-01-01 DIAGNOSIS — F03.90 UNSPECIFIED DEMENTIA, UNSPECIFIED SEVERITY, WITHOUT BEHAVIORAL DISTURBANCE, PSYCHOTIC DISTURBANCE, MOOD DISTURBANCE, AND ANXIETY: ICD-10-CM

## 2023-01-01 DIAGNOSIS — I12.0 HYPERTENSIVE CHRONIC KIDNEY DISEASE WITH STAGE 5 CHRONIC KIDNEY DISEASE OR END STAGE RENAL DISEASE: ICD-10-CM

## 2023-01-01 DIAGNOSIS — Z85.46 PERSONAL HISTORY OF MALIGNANT NEOPLASM OF PROSTATE: ICD-10-CM

## 2023-01-01 DIAGNOSIS — Z71.89 OTHER SPECIFIED COUNSELING: ICD-10-CM

## 2023-01-01 DIAGNOSIS — Z20.822 CONTACT WITH AND (SUSPECTED) EXPOSURE TO COVID-19: ICD-10-CM

## 2023-01-01 DIAGNOSIS — N40.0 BENIGN PROSTATIC HYPERPLASIA WITHOUT LOWER URINARY TRACT SYMPMS: ICD-10-CM

## 2023-01-01 DIAGNOSIS — I49.8 OTHER SPECIFIED CARDIAC ARRHYTHMIAS: ICD-10-CM

## 2023-01-01 DIAGNOSIS — J18.9 PNEUMONIA, UNSPECIFIED ORGANISM: ICD-10-CM

## 2023-01-01 DIAGNOSIS — R05.9 COUGH, UNSPECIFIED: ICD-10-CM

## 2023-01-01 DIAGNOSIS — R62.7 ADULT FAILURE TO THRIVE: ICD-10-CM

## 2023-01-01 DIAGNOSIS — I13.0 HYPERTENSIVE HEART AND CHRONIC KIDNEY DISEASE WITH HEART FAILURE AND STAGE 1 THROUGH STAGE 4 CHRONIC KIDNEY DISEASE, OR UNSPECIFIED CHRONIC KIDNEY DISEASE: ICD-10-CM

## 2023-01-01 DIAGNOSIS — K92.1 MELENA: ICD-10-CM

## 2023-01-01 DIAGNOSIS — F33.9 MAJOR DEPRESSIVE DISORDER, RECURRENT, UNSPECIFIED: ICD-10-CM

## 2023-01-01 DIAGNOSIS — N17.9 ACUTE KIDNEY FAILURE, UNSPECIFIED: ICD-10-CM

## 2023-01-01 DIAGNOSIS — E87.70 FLUID OVERLOAD, UNSPECIFIED: ICD-10-CM

## 2023-01-01 DIAGNOSIS — E78.5 HYPERLIPIDEMIA, UNSPECIFIED: ICD-10-CM

## 2023-01-01 DIAGNOSIS — G92.9 UNSPECIFIED TOXIC ENCEPHALOPATHY: ICD-10-CM

## 2023-01-01 DIAGNOSIS — G93.41 METABOLIC ENCEPHALOPATHY: ICD-10-CM
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
## 2023-01-01 DIAGNOSIS — D64.9 ANEMIA, UNSPECIFIED: ICD-10-CM

## 2023-01-01 DIAGNOSIS — I12.9 HYPERTENSIVE CHRONIC KIDNEY DISEASE WITH STAGE 1 THROUGH STAGE 4 CHRONIC KIDNEY DISEASE, OR UNSPECIFIED CHRONIC KIDNEY DISEASE: ICD-10-CM

## 2023-01-01 DIAGNOSIS — I50.9 HEART FAILURE, UNSPECIFIED: ICD-10-CM

## 2023-01-01 DIAGNOSIS — M62.838 OTHER MUSCLE SPASM: ICD-10-CM

## 2023-01-01 DIAGNOSIS — I70.0 ATHEROSCLEROSIS OF AORTA: ICD-10-CM

## 2023-01-01 DIAGNOSIS — M16.11 UNILATERAL PRIMARY OSTEOARTHRITIS, RIGHT HIP: ICD-10-CM

## 2023-01-01 DIAGNOSIS — J96.01 ACUTE RESPIRATORY FAILURE WITH HYPOXIA: ICD-10-CM

## 2023-01-01 DIAGNOSIS — F01.50 VASCULAR DEMENTIA W/OUT BEHAVIORAL DISTURBANCE: ICD-10-CM

## 2023-01-01 DIAGNOSIS — I21.A1 MYOCARDIAL INFARCTION TYPE 2: ICD-10-CM

## 2023-01-01 DIAGNOSIS — E44.1 MILD PROTEIN-CALORIE MALNUTRITION: ICD-10-CM

## 2023-01-01 DIAGNOSIS — E43 UNSPECIFIED SEVERE PROTEIN-CALORIE MALNUTRITION: ICD-10-CM

## 2023-01-01 DIAGNOSIS — N39.0 URINARY TRACT INFECTION, SITE NOT SPECIFIED: ICD-10-CM
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
## 2023-01-01 DIAGNOSIS — B96.89 OTHER SPECIFIED BACTERIAL AGENTS AS THE CAUSE OF DISEASES CLASSIFIED ELSEWHERE: ICD-10-CM

## 2023-01-01 DIAGNOSIS — Z79.82 LONG TERM (CURRENT) USE OF ASPIRIN: ICD-10-CM

## 2023-01-01 DIAGNOSIS — C61 MALIGNANT NEOPLASM OF PROSTATE: ICD-10-CM

## 2023-01-01 LAB
ABO RH CONFIRMATION: SIGNIFICANT CHANGE UP
ALBUMIN SERPL ELPH-MCNC: 2.6 G/DL — LOW (ref 3.3–5)
ALBUMIN SERPL ELPH-MCNC: 3 G/DL — LOW (ref 3.3–5)
ALBUMIN SERPL ELPH-MCNC: 3.2 G/DL — LOW (ref 3.3–5)
ALP SERPL-CCNC: 54 U/L — SIGNIFICANT CHANGE UP (ref 40–120)
ALP SERPL-CCNC: 59 U/L — SIGNIFICANT CHANGE UP (ref 40–120)
ALP SERPL-CCNC: 68 U/L — SIGNIFICANT CHANGE UP (ref 40–120)
ALT FLD-CCNC: 17 U/L — SIGNIFICANT CHANGE UP (ref 12–78)
ALT FLD-CCNC: 18 U/L — SIGNIFICANT CHANGE UP (ref 12–78)
ALT FLD-CCNC: 23 U/L — SIGNIFICANT CHANGE UP (ref 12–78)
ANION GAP SERPL CALC-SCNC: 3 MMOL/L — LOW (ref 5–17)
ANION GAP SERPL CALC-SCNC: 5 MMOL/L — SIGNIFICANT CHANGE UP (ref 5–17)
ANION GAP SERPL CALC-SCNC: 6 MMOL/L — SIGNIFICANT CHANGE UP (ref 5–17)
ANION GAP SERPL CALC-SCNC: 6 MMOL/L — SIGNIFICANT CHANGE UP (ref 5–17)
ANION GAP SERPL CALC-SCNC: 8 MMOL/L — SIGNIFICANT CHANGE UP (ref 5–17)
ANION GAP SERPL CALC-SCNC: 9 MMOL/L — SIGNIFICANT CHANGE UP (ref 5–17)
ANISOCYTOSIS BLD QL: SLIGHT — SIGNIFICANT CHANGE UP
APPEARANCE UR: ABNORMAL
APTT BLD: 20.7 SEC — LOW (ref 27.5–35.5)
APTT BLD: 26.2 SEC — SIGNIFICANT CHANGE UP (ref 24.5–35.6)
AST SERPL-CCNC: 22 U/L — SIGNIFICANT CHANGE UP (ref 15–37)
AST SERPL-CCNC: 27 U/L — SIGNIFICANT CHANGE UP (ref 15–37)
AST SERPL-CCNC: 31 U/L — SIGNIFICANT CHANGE UP (ref 15–37)
BACTERIA # UR AUTO: ABNORMAL
BASE EXCESS BLDV CALC-SCNC: -3.5 MMOL/L — LOW (ref -2–3)
BASOPHILS # BLD AUTO: 0.01 K/UL — SIGNIFICANT CHANGE UP (ref 0–0.2)
BASOPHILS # BLD AUTO: 0.02 K/UL — SIGNIFICANT CHANGE UP (ref 0–0.2)
BASOPHILS # BLD AUTO: 0.04 K/UL — SIGNIFICANT CHANGE UP (ref 0–0.2)
BASOPHILS NFR BLD AUTO: 0.2 % — SIGNIFICANT CHANGE UP (ref 0–2)
BASOPHILS NFR BLD AUTO: 0.3 % — SIGNIFICANT CHANGE UP (ref 0–2)
BASOPHILS NFR BLD AUTO: 0.6 % — SIGNIFICANT CHANGE UP (ref 0–2)
BILIRUB SERPL-MCNC: 0.4 MG/DL — SIGNIFICANT CHANGE UP (ref 0.2–1.2)
BILIRUB SERPL-MCNC: 0.5 MG/DL — SIGNIFICANT CHANGE UP (ref 0.2–1.2)
BILIRUB SERPL-MCNC: 0.6 MG/DL — SIGNIFICANT CHANGE UP (ref 0.2–1.2)
BILIRUB UR-MCNC: NEGATIVE — SIGNIFICANT CHANGE UP
BLD GP AB SCN SERPL QL: SIGNIFICANT CHANGE UP
BUN SERPL-MCNC: 68 MG/DL — HIGH (ref 7–23)
BUN SERPL-MCNC: 70 MG/DL — HIGH (ref 7–23)
BUN SERPL-MCNC: 73 MG/DL — HIGH (ref 7–23)
BUN SERPL-MCNC: 83 MG/DL — HIGH (ref 7–23)
CALCIUM SERPL-MCNC: 8.3 MG/DL — LOW (ref 8.5–10.1)
CALCIUM SERPL-MCNC: 8.5 MG/DL — SIGNIFICANT CHANGE UP (ref 8.5–10.1)
CALCIUM SERPL-MCNC: 8.5 MG/DL — SIGNIFICANT CHANGE UP (ref 8.5–10.1)
CALCIUM SERPL-MCNC: 8.6 MG/DL — SIGNIFICANT CHANGE UP (ref 8.5–10.1)
CALCIUM SERPL-MCNC: 8.7 MG/DL — SIGNIFICANT CHANGE UP (ref 8.5–10.1)
CALCIUM SERPL-MCNC: 9.2 MG/DL — SIGNIFICANT CHANGE UP (ref 8.5–10.1)
CHLORIDE SERPL-SCNC: 103 MMOL/L — SIGNIFICANT CHANGE UP (ref 96–108)
CHLORIDE SERPL-SCNC: 108 MMOL/L — SIGNIFICANT CHANGE UP (ref 96–108)
CHLORIDE SERPL-SCNC: 109 MMOL/L — HIGH (ref 96–108)
CHLORIDE SERPL-SCNC: 113 MMOL/L — HIGH (ref 96–108)
CO2 SERPL-SCNC: 22 MMOL/L — SIGNIFICANT CHANGE UP (ref 22–31)
CO2 SERPL-SCNC: 23 MMOL/L — SIGNIFICANT CHANGE UP (ref 22–31)
CO2 SERPL-SCNC: 24 MMOL/L — SIGNIFICANT CHANGE UP (ref 22–31)
CO2 SERPL-SCNC: 25 MMOL/L — SIGNIFICANT CHANGE UP (ref 22–31)
CO2 SERPL-SCNC: 25 MMOL/L — SIGNIFICANT CHANGE UP (ref 22–31)
CO2 SERPL-SCNC: 27 MMOL/L — SIGNIFICANT CHANGE UP (ref 22–31)
COLOR SPEC: YELLOW — SIGNIFICANT CHANGE UP
CREAT SERPL-MCNC: 4.39 MG/DL — HIGH (ref 0.5–1.3)
CREAT SERPL-MCNC: 4.55 MG/DL — HIGH (ref 0.5–1.3)
CREAT SERPL-MCNC: 4.57 MG/DL — HIGH (ref 0.5–1.3)
CREAT SERPL-MCNC: 4.73 MG/DL — HIGH (ref 0.5–1.3)
CREAT SERPL-MCNC: 5.31 MG/DL — HIGH (ref 0.5–1.3)
CREAT SERPL-MCNC: 5.71 MG/DL — HIGH (ref 0.5–1.3)
CULTURE RESULTS: SIGNIFICANT CHANGE UP
DIFF PNL FLD: ABNORMAL
EGFR: 10 ML/MIN/1.73M2 — LOW
EGFR: 11 ML/MIN/1.73M2 — LOW
EGFR: 12 ML/MIN/1.73M2 — LOW
EGFR: 9 ML/MIN/1.73M2 — LOW
EOSINOPHIL # BLD AUTO: 0.02 K/UL — SIGNIFICANT CHANGE UP (ref 0–0.5)
EOSINOPHIL # BLD AUTO: 0.25 K/UL — SIGNIFICANT CHANGE UP (ref 0–0.5)
EOSINOPHIL # BLD AUTO: 0.33 K/UL — SIGNIFICANT CHANGE UP (ref 0–0.5)
EOSINOPHIL NFR BLD AUTO: 0.4 % — SIGNIFICANT CHANGE UP (ref 0–6)
EOSINOPHIL NFR BLD AUTO: 4.2 % — SIGNIFICANT CHANGE UP (ref 0–6)
EOSINOPHIL NFR BLD AUTO: 4.9 % — SIGNIFICANT CHANGE UP (ref 0–6)
EPI CELLS # UR: SIGNIFICANT CHANGE UP
GLUCOSE SERPL-MCNC: 102 MG/DL — HIGH (ref 70–99)
GLUCOSE SERPL-MCNC: 111 MG/DL — HIGH (ref 70–99)
GLUCOSE SERPL-MCNC: 168 MG/DL — HIGH (ref 70–99)
GLUCOSE SERPL-MCNC: 92 MG/DL — SIGNIFICANT CHANGE UP (ref 70–99)
GLUCOSE SERPL-MCNC: 97 MG/DL — SIGNIFICANT CHANGE UP (ref 70–99)
GLUCOSE SERPL-MCNC: 98 MG/DL — SIGNIFICANT CHANGE UP (ref 70–99)
GLUCOSE UR QL: NEGATIVE — SIGNIFICANT CHANGE UP
HCO3 BLDV-SCNC: 22 MMOL/L — SIGNIFICANT CHANGE UP (ref 22–29)
HCT VFR BLD CALC: 21.3 % — LOW (ref 39–50)
HCT VFR BLD CALC: 22.4 % — LOW (ref 39–50)
HCT VFR BLD CALC: 23.7 % — LOW (ref 39–50)
HCT VFR BLD CALC: 23.9 % — LOW (ref 39–50)
HCT VFR BLD CALC: 24.8 % — LOW (ref 39–50)
HCT VFR BLD CALC: 25.6 % — LOW (ref 39–50)
HCT VFR BLD CALC: 28.2 % — LOW (ref 39–50)
HGB BLD-MCNC: 7 G/DL — CRITICAL LOW (ref 13–17)
HGB BLD-MCNC: 7.5 G/DL — LOW (ref 13–17)
HGB BLD-MCNC: 7.7 G/DL — LOW (ref 13–17)
HGB BLD-MCNC: 7.9 G/DL — LOW (ref 13–17)
HGB BLD-MCNC: 8.2 G/DL — LOW (ref 13–17)
HGB BLD-MCNC: 8.3 G/DL — LOW (ref 13–17)
HGB BLD-MCNC: 9.2 G/DL — LOW (ref 13–17)
HPIV4 RNA SPEC QL NAA+PROBE: DETECTED
HYPOCHROMIA BLD QL: SIGNIFICANT CHANGE UP
IMM GRANULOCYTES NFR BLD AUTO: 0.4 % — SIGNIFICANT CHANGE UP (ref 0–0.9)
IMM GRANULOCYTES NFR BLD AUTO: 0.6 % — SIGNIFICANT CHANGE UP (ref 0–0.9)
IMM GRANULOCYTES NFR BLD AUTO: 0.8 % — SIGNIFICANT CHANGE UP (ref 0–0.9)
INR BLD: 1.03 RATIO — SIGNIFICANT CHANGE UP (ref 0.85–1.18)
INR BLD: 1.09 RATIO — SIGNIFICANT CHANGE UP (ref 0.88–1.16)
KETONES UR-MCNC: NEGATIVE — SIGNIFICANT CHANGE UP
LACTATE SERPL-SCNC: 1.5 MMOL/L — SIGNIFICANT CHANGE UP (ref 0.7–2)
LEUKOCYTE ESTERASE UR-ACNC: ABNORMAL
LIDOCAIN IGE QN: 123 U/L — SIGNIFICANT CHANGE UP (ref 73–393)
LYMPHOCYTES # BLD AUTO: 0.62 K/UL — LOW (ref 1–3.3)
LYMPHOCYTES # BLD AUTO: 0.8 K/UL — LOW (ref 1–3.3)
LYMPHOCYTES # BLD AUTO: 0.85 K/UL — LOW (ref 1–3.3)
LYMPHOCYTES # BLD AUTO: 11.1 % — LOW (ref 13–44)
LYMPHOCYTES # BLD AUTO: 11.9 % — LOW (ref 13–44)
LYMPHOCYTES # BLD AUTO: 14.4 % — SIGNIFICANT CHANGE UP (ref 13–44)
MANUAL SMEAR VERIFICATION: SIGNIFICANT CHANGE UP
MCHC RBC-ENTMCNC: 30.2 PG — SIGNIFICANT CHANGE UP (ref 27–34)
MCHC RBC-ENTMCNC: 30.3 PG — SIGNIFICANT CHANGE UP (ref 27–34)
MCHC RBC-ENTMCNC: 30.5 PG — SIGNIFICANT CHANGE UP (ref 27–34)
MCHC RBC-ENTMCNC: 30.8 PG — SIGNIFICANT CHANGE UP (ref 27–34)
MCHC RBC-ENTMCNC: 32.4 GM/DL — SIGNIFICANT CHANGE UP (ref 32–36)
MCHC RBC-ENTMCNC: 32.5 GM/DL — SIGNIFICANT CHANGE UP (ref 32–36)
MCHC RBC-ENTMCNC: 32.6 GM/DL — SIGNIFICANT CHANGE UP (ref 32–36)
MCHC RBC-ENTMCNC: 32.6 PG — SIGNIFICANT CHANGE UP (ref 27–34)
MCHC RBC-ENTMCNC: 32.9 GM/DL — SIGNIFICANT CHANGE UP (ref 32–36)
MCHC RBC-ENTMCNC: 33.1 GM/DL — SIGNIFICANT CHANGE UP (ref 32–36)
MCV RBC AUTO: 92.9 FL — SIGNIFICANT CHANGE UP (ref 80–100)
MCV RBC AUTO: 93.2 FL — SIGNIFICANT CHANGE UP (ref 80–100)
MCV RBC AUTO: 93.4 FL — SIGNIFICANT CHANGE UP (ref 80–100)
MCV RBC AUTO: 93.4 FL — SIGNIFICANT CHANGE UP (ref 80–100)
MCV RBC AUTO: 99.1 FL — SIGNIFICANT CHANGE UP (ref 80–100)
MICROCYTES BLD QL: SLIGHT — SIGNIFICANT CHANGE UP
MONOCYTES # BLD AUTO: 0.43 K/UL — SIGNIFICANT CHANGE UP (ref 0–0.9)
MONOCYTES # BLD AUTO: 0.48 K/UL — SIGNIFICANT CHANGE UP (ref 0–0.9)
MONOCYTES # BLD AUTO: 0.5 K/UL — SIGNIFICANT CHANGE UP (ref 0–0.9)
MONOCYTES NFR BLD AUTO: 7.1 % — SIGNIFICANT CHANGE UP (ref 2–14)
MONOCYTES NFR BLD AUTO: 7.7 % — SIGNIFICANT CHANGE UP (ref 2–14)
MONOCYTES NFR BLD AUTO: 8.4 % — SIGNIFICANT CHANGE UP (ref 2–14)
NEUTROPHILS # BLD AUTO: 4.25 K/UL — SIGNIFICANT CHANGE UP (ref 1.8–7.4)
NEUTROPHILS # BLD AUTO: 4.49 K/UL — SIGNIFICANT CHANGE UP (ref 1.8–7.4)
NEUTROPHILS # BLD AUTO: 5.05 K/UL — SIGNIFICANT CHANGE UP (ref 1.8–7.4)
NEUTROPHILS NFR BLD AUTO: 71.9 % — SIGNIFICANT CHANGE UP (ref 43–77)
NEUTROPHILS NFR BLD AUTO: 74.9 % — SIGNIFICANT CHANGE UP (ref 43–77)
NEUTROPHILS NFR BLD AUTO: 80.2 % — HIGH (ref 43–77)
NITRITE UR-MCNC: NEGATIVE — SIGNIFICANT CHANGE UP
NT-PROBNP SERPL-SCNC: HIGH PG/ML (ref 0–450)
PCO2 BLDV: 41 MMHG — LOW (ref 42–55)
PH BLDV: 7.34 — SIGNIFICANT CHANGE UP (ref 7.32–7.43)
PH UR: 7 — SIGNIFICANT CHANGE UP (ref 5–8)
PLAT MORPH BLD: NORMAL — SIGNIFICANT CHANGE UP
PLATELET # BLD AUTO: 162 K/UL — SIGNIFICANT CHANGE UP (ref 150–400)
PLATELET # BLD AUTO: 180 K/UL — SIGNIFICANT CHANGE UP (ref 150–400)
PLATELET # BLD AUTO: 254 K/UL — SIGNIFICANT CHANGE UP (ref 150–400)
PLATELET # BLD AUTO: 260 K/UL — SIGNIFICANT CHANGE UP (ref 150–400)
PLATELET # BLD AUTO: 278 K/UL — SIGNIFICANT CHANGE UP (ref 150–400)
PLATELET COUNT - ESTIMATE: NORMAL — SIGNIFICANT CHANGE UP
PO2 BLDV: 58 MMHG — HIGH (ref 25–45)
POTASSIUM SERPL-MCNC: 4.1 MMOL/L — SIGNIFICANT CHANGE UP (ref 3.5–5.3)
POTASSIUM SERPL-MCNC: 4.3 MMOL/L — SIGNIFICANT CHANGE UP (ref 3.5–5.3)
POTASSIUM SERPL-MCNC: 4.4 MMOL/L — SIGNIFICANT CHANGE UP (ref 3.5–5.3)
POTASSIUM SERPL-MCNC: 4.5 MMOL/L — SIGNIFICANT CHANGE UP (ref 3.5–5.3)
POTASSIUM SERPL-MCNC: 4.6 MMOL/L — SIGNIFICANT CHANGE UP (ref 3.5–5.3)
POTASSIUM SERPL-MCNC: 4.9 MMOL/L — SIGNIFICANT CHANGE UP (ref 3.5–5.3)
POTASSIUM SERPL-SCNC: 4.1 MMOL/L — SIGNIFICANT CHANGE UP (ref 3.5–5.3)
POTASSIUM SERPL-SCNC: 4.3 MMOL/L — SIGNIFICANT CHANGE UP (ref 3.5–5.3)
POTASSIUM SERPL-SCNC: 4.4 MMOL/L — SIGNIFICANT CHANGE UP (ref 3.5–5.3)
POTASSIUM SERPL-SCNC: 4.5 MMOL/L — SIGNIFICANT CHANGE UP (ref 3.5–5.3)
POTASSIUM SERPL-SCNC: 4.6 MMOL/L — SIGNIFICANT CHANGE UP (ref 3.5–5.3)
POTASSIUM SERPL-SCNC: 4.9 MMOL/L — SIGNIFICANT CHANGE UP (ref 3.5–5.3)
PROT SERPL-MCNC: 6.6 GM/DL — SIGNIFICANT CHANGE UP (ref 6–8.3)
PROT SERPL-MCNC: 6.7 GM/DL — SIGNIFICANT CHANGE UP (ref 6–8.3)
PROT SERPL-MCNC: 6.8 GM/DL — SIGNIFICANT CHANGE UP (ref 6–8.3)
PROT UR-MCNC: 100
PROTHROM AB SERPL-ACNC: 11.6 SEC — SIGNIFICANT CHANGE UP (ref 9.5–13)
PROTHROM AB SERPL-ACNC: 12.6 SEC — SIGNIFICANT CHANGE UP (ref 10.5–13.4)
RAPID RVP RESULT: DETECTED
RAPID RVP RESULT: SIGNIFICANT CHANGE UP
RAPID RVP RESULT: SIGNIFICANT CHANGE UP
RBC # BLD: 2.15 M/UL — LOW (ref 4.2–5.8)
RBC # BLD: 2.55 M/UL — LOW (ref 4.2–5.8)
RBC # BLD: 2.66 M/UL — LOW (ref 4.2–5.8)
RBC # BLD: 2.74 M/UL — LOW (ref 4.2–5.8)
RBC # BLD: 3.02 M/UL — LOW (ref 4.2–5.8)
RBC # FLD: 14.2 % — SIGNIFICANT CHANGE UP (ref 10.3–14.5)
RBC # FLD: 16 % — HIGH (ref 10.3–14.5)
RBC # FLD: 16.7 % — HIGH (ref 10.3–14.5)
RBC BLD AUTO: ABNORMAL
RBC CASTS # UR COMP ASSIST: ABNORMAL /HPF (ref 0–4)
SAO2 % BLDV: 89 % — HIGH (ref 67–88)
SARS-COV-2 RNA SPEC QL NAA+PROBE: SIGNIFICANT CHANGE UP
SODIUM SERPL-SCNC: 136 MMOL/L — SIGNIFICANT CHANGE UP (ref 135–145)
SODIUM SERPL-SCNC: 137 MMOL/L — SIGNIFICANT CHANGE UP (ref 135–145)
SODIUM SERPL-SCNC: 139 MMOL/L — SIGNIFICANT CHANGE UP (ref 135–145)
SODIUM SERPL-SCNC: 140 MMOL/L — SIGNIFICANT CHANGE UP (ref 135–145)
SODIUM SERPL-SCNC: 140 MMOL/L — SIGNIFICANT CHANGE UP (ref 135–145)
SODIUM SERPL-SCNC: 142 MMOL/L — SIGNIFICANT CHANGE UP (ref 135–145)
SP GR SPEC: 1.01 — SIGNIFICANT CHANGE UP (ref 1.01–1.02)
SPECIMEN SOURCE: SIGNIFICANT CHANGE UP
TROPONIN I, HIGH SENSITIVITY RESULT: 13.18 NG/L — SIGNIFICANT CHANGE UP
TROPONIN I, HIGH SENSITIVITY RESULT: 2038.46 NG/L — HIGH
TROPONIN I, HIGH SENSITIVITY RESULT: 2483.93 NG/L — HIGH
TROPONIN I, HIGH SENSITIVITY RESULT: 26.85 NG/L — SIGNIFICANT CHANGE UP
TROPONIN I, HIGH SENSITIVITY RESULT: 472.56 NG/L — HIGH
UROBILINOGEN FLD QL: NEGATIVE — SIGNIFICANT CHANGE UP
WBC # BLD: 5.59 K/UL — SIGNIFICANT CHANGE UP (ref 3.8–10.5)
WBC # BLD: 5.92 K/UL — SIGNIFICANT CHANGE UP (ref 3.8–10.5)
WBC # BLD: 6.5 K/UL — SIGNIFICANT CHANGE UP (ref 3.8–10.5)
WBC # BLD: 6.63 K/UL — SIGNIFICANT CHANGE UP (ref 3.8–10.5)
WBC # BLD: 6.74 K/UL — SIGNIFICANT CHANGE UP (ref 3.8–10.5)
WBC # FLD AUTO: 5.59 K/UL — SIGNIFICANT CHANGE UP (ref 3.8–10.5)
WBC # FLD AUTO: 5.92 K/UL — SIGNIFICANT CHANGE UP (ref 3.8–10.5)
WBC # FLD AUTO: 6.5 K/UL — SIGNIFICANT CHANGE UP (ref 3.8–10.5)
WBC # FLD AUTO: 6.63 K/UL — SIGNIFICANT CHANGE UP (ref 3.8–10.5)
WBC # FLD AUTO: 6.74 K/UL — SIGNIFICANT CHANGE UP (ref 3.8–10.5)
WBC UR QL: ABNORMAL /HPF (ref 0–5)

## 2023-01-01 PROCEDURE — 0225U NFCT DS DNA&RNA 21 SARSCOV2: CPT

## 2023-01-01 PROCEDURE — 72192 CT PELVIS W/O DYE: CPT | Mod: MG

## 2023-01-01 PROCEDURE — G1004: CPT

## 2023-01-01 PROCEDURE — 85018 HEMOGLOBIN: CPT

## 2023-01-01 PROCEDURE — 97116 GAIT TRAINING THERAPY: CPT | Mod: GP

## 2023-01-01 PROCEDURE — 85027 COMPLETE CBC AUTOMATED: CPT

## 2023-01-01 PROCEDURE — 99497 ADVNCD CARE PLAN 30 MIN: CPT

## 2023-01-01 PROCEDURE — 99222 1ST HOSP IP/OBS MODERATE 55: CPT

## 2023-01-01 PROCEDURE — 85025 COMPLETE CBC W/AUTO DIFF WBC: CPT

## 2023-01-01 PROCEDURE — 99285 EMERGENCY DEPT VISIT HI MDM: CPT

## 2023-01-01 PROCEDURE — 73502 X-RAY EXAM HIP UNI 2-3 VIEWS: CPT | Mod: RT

## 2023-01-01 PROCEDURE — 73552 X-RAY EXAM OF FEMUR 2/>: CPT | Mod: 26,RT

## 2023-01-01 PROCEDURE — 90471 IMMUNIZATION ADMIN: CPT

## 2023-01-01 PROCEDURE — 81001 URINALYSIS AUTO W/SCOPE: CPT

## 2023-01-01 PROCEDURE — 72192 CT PELVIS W/O DYE: CPT | Mod: 26,MG

## 2023-01-01 PROCEDURE — 71045 X-RAY EXAM CHEST 1 VIEW: CPT | Mod: 26

## 2023-01-01 PROCEDURE — 97530 THERAPEUTIC ACTIVITIES: CPT | Mod: GP

## 2023-01-01 PROCEDURE — 99239 HOSP IP/OBS DSCHRG MGMT >30: CPT

## 2023-01-01 PROCEDURE — 80053 COMPREHEN METABOLIC PANEL: CPT

## 2023-01-01 PROCEDURE — 99233 SBSQ HOSP IP/OBS HIGH 50: CPT

## 2023-01-01 PROCEDURE — 85014 HEMATOCRIT: CPT

## 2023-01-01 PROCEDURE — 87086 URINE CULTURE/COLONY COUNT: CPT

## 2023-01-01 PROCEDURE — 99291 CRITICAL CARE FIRST HOUR: CPT

## 2023-01-01 PROCEDURE — 72125 CT NECK SPINE W/O DYE: CPT | Mod: 26,MG

## 2023-01-01 PROCEDURE — 99223 1ST HOSP IP/OBS HIGH 75: CPT

## 2023-01-01 PROCEDURE — 93010 ELECTROCARDIOGRAM REPORT: CPT

## 2023-01-01 PROCEDURE — 97163 PT EVAL HIGH COMPLEX 45 MIN: CPT | Mod: GP

## 2023-01-01 PROCEDURE — C9113: CPT

## 2023-01-01 PROCEDURE — 70450 CT HEAD/BRAIN W/O DYE: CPT | Mod: MG

## 2023-01-01 PROCEDURE — 80048 BASIC METABOLIC PNL TOTAL CA: CPT

## 2023-01-01 PROCEDURE — 76376 3D RENDER W/INTRP POSTPROCES: CPT | Mod: 26

## 2023-01-01 PROCEDURE — 71045 X-RAY EXAM CHEST 1 VIEW: CPT

## 2023-01-01 PROCEDURE — 36415 COLL VENOUS BLD VENIPUNCTURE: CPT

## 2023-01-01 PROCEDURE — 99284 EMERGENCY DEPT VISIT MOD MDM: CPT

## 2023-01-01 PROCEDURE — 94640 AIRWAY INHALATION TREATMENT: CPT

## 2023-01-01 PROCEDURE — 99285 EMERGENCY DEPT VISIT HI MDM: CPT | Mod: 25

## 2023-01-01 PROCEDURE — 96375 TX/PRO/DX INJ NEW DRUG ADDON: CPT

## 2023-01-01 PROCEDURE — 96374 THER/PROPH/DIAG INJ IV PUSH: CPT

## 2023-01-01 PROCEDURE — 99344 HOME/RES VST NEW MOD MDM 60: CPT | Mod: 25

## 2023-01-01 PROCEDURE — 76376 3D RENDER W/INTRP POSTPROCES: CPT

## 2023-01-01 PROCEDURE — 99232 SBSQ HOSP IP/OBS MODERATE 35: CPT

## 2023-01-01 PROCEDURE — 93005 ELECTROCARDIOGRAM TRACING: CPT

## 2023-01-01 PROCEDURE — 84484 ASSAY OF TROPONIN QUANT: CPT

## 2023-01-01 PROCEDURE — 73502 X-RAY EXAM HIP UNI 2-3 VIEWS: CPT | Mod: 26,RT

## 2023-01-01 PROCEDURE — G0506: CPT

## 2023-01-01 PROCEDURE — P9016: CPT

## 2023-01-01 PROCEDURE — 82962 GLUCOSE BLOOD TEST: CPT

## 2023-01-01 PROCEDURE — 72125 CT NECK SPINE W/O DYE: CPT | Mod: MG

## 2023-01-01 PROCEDURE — 70450 CT HEAD/BRAIN W/O DYE: CPT | Mod: 26,MG

## 2023-01-01 PROCEDURE — 99223 1ST HOSP IP/OBS HIGH 75: CPT | Mod: GC

## 2023-01-01 PROCEDURE — 70450 CT HEAD/BRAIN W/O DYE: CPT | Mod: 26,MA

## 2023-01-01 PROCEDURE — 90715 TDAP VACCINE 7 YRS/> IM: CPT

## 2023-01-01 PROCEDURE — 73552 X-RAY EXAM OF FEMUR 2/>: CPT | Mod: RT

## 2023-01-01 PROCEDURE — 87040 BLOOD CULTURE FOR BACTERIA: CPT

## 2023-01-01 PROCEDURE — 36430 TRANSFUSION BLD/BLD COMPNT: CPT

## 2023-01-01 PROCEDURE — 86920 COMPATIBILITY TEST SPIN: CPT

## 2023-01-01 RX ORDER — SODIUM BICARBONATE 650 MG/1
650 TABLET ORAL TWICE DAILY
Qty: 180 | Refills: 3 | Status: ACTIVE | COMMUNITY
Start: 2023-01-01

## 2023-01-01 RX ORDER — CITALOPRAM 10 MG/1
1 TABLET, FILM COATED ORAL
Qty: 0 | Refills: 0 | DISCHARGE

## 2023-01-01 RX ORDER — AZITHROMYCIN 500 MG/1
500 TABLET, FILM COATED ORAL ONCE
Refills: 0 | Status: COMPLETED | OUTPATIENT
Start: 2023-01-01 | End: 2023-01-01

## 2023-01-01 RX ORDER — CEFTRIAXONE 500 MG/1
1000 INJECTION, POWDER, FOR SOLUTION INTRAMUSCULAR; INTRAVENOUS EVERY 24 HOURS
Refills: 0 | Status: DISCONTINUED | OUTPATIENT
Start: 2023-01-01 | End: 2023-01-01

## 2023-01-01 RX ORDER — AZITHROMYCIN 500 MG/1
500 TABLET, FILM COATED ORAL EVERY 24 HOURS
Refills: 0 | Status: DISCONTINUED | OUTPATIENT
Start: 2023-01-01 | End: 2023-01-01

## 2023-01-01 RX ORDER — ASPIRIN/CALCIUM CARB/MAGNESIUM 324 MG
1 TABLET ORAL
Qty: 0 | Refills: 0 | DISCHARGE

## 2023-01-01 RX ORDER — MORPHINE SULFATE 50 MG/1
2 CAPSULE, EXTENDED RELEASE ORAL ONCE
Refills: 0 | Status: DISCONTINUED | OUTPATIENT
Start: 2023-01-01 | End: 2023-01-01

## 2023-01-01 RX ORDER — IPRATROPIUM/ALBUTEROL SULFATE 18-103MCG
3 AEROSOL WITH ADAPTER (GRAM) INHALATION EVERY 6 HOURS
Refills: 0 | Status: DISCONTINUED | OUTPATIENT
Start: 2023-01-01 | End: 2023-01-01

## 2023-01-01 RX ORDER — FUROSEMIDE 40 MG
1 TABLET ORAL
Refills: 0 | DISCHARGE

## 2023-01-01 RX ORDER — SODIUM BICARBONATE 1 MEQ/ML
2 SYRINGE (ML) INTRAVENOUS
Qty: 0 | Refills: 0 | DISCHARGE

## 2023-01-01 RX ORDER — HEPARIN SODIUM 5000 [USP'U]/ML
5000 INJECTION INTRAVENOUS; SUBCUTANEOUS EVERY 12 HOURS
Refills: 0 | Status: DISCONTINUED | OUTPATIENT
Start: 2023-01-01 | End: 2023-01-01

## 2023-01-01 RX ORDER — LOPERAMIDE HCL 2 MG
1 TABLET ORAL
Qty: 0 | Refills: 0 | DISCHARGE

## 2023-01-01 RX ORDER — FUROSEMIDE 40 MG/1
40 TABLET ORAL
Refills: 0 | Status: ACTIVE | COMMUNITY
Start: 2023-01-01

## 2023-01-01 RX ORDER — FUROSEMIDE 40 MG
60 TABLET ORAL ONCE
Refills: 0 | Status: COMPLETED | OUTPATIENT
Start: 2023-01-01 | End: 2023-01-01

## 2023-01-01 RX ORDER — SODIUM CHLORIDE 9 MG/ML
1000 INJECTION INTRAMUSCULAR; INTRAVENOUS; SUBCUTANEOUS ONCE
Refills: 0 | Status: COMPLETED | OUTPATIENT
Start: 2023-01-01 | End: 2023-01-01

## 2023-01-01 RX ORDER — CHLORHEXIDINE GLUCONATE 4 %
325 (65 FE) LIQUID (ML) TOPICAL TWICE DAILY
Qty: 180 | Refills: 0 | Status: ACTIVE | COMMUNITY
Start: 2023-01-01

## 2023-01-01 RX ORDER — DOXAZOSIN 2 MG/1
2 TABLET ORAL AT BEDTIME
Refills: 0 | Status: ACTIVE | COMMUNITY
Start: 2023-01-01

## 2023-01-01 RX ORDER — SODIUM BICARBONATE 1 MEQ/ML
2 SYRINGE (ML) INTRAVENOUS
Refills: 0 | DISCHARGE

## 2023-01-01 RX ORDER — ATORVASTATIN CALCIUM 80 MG/1
10 TABLET, FILM COATED ORAL AT BEDTIME
Refills: 0 | Status: DISCONTINUED | OUTPATIENT
Start: 2023-01-01 | End: 2023-01-01

## 2023-01-01 RX ORDER — AMLODIPINE BESYLATE 2.5 MG/1
1 TABLET ORAL
Qty: 0 | Refills: 0 | DISCHARGE

## 2023-01-01 RX ORDER — LANOLIN ALCOHOL/MO/W.PET/CERES
3 CREAM (GRAM) TOPICAL AT BEDTIME
Refills: 0 | Status: DISCONTINUED | OUTPATIENT
Start: 2023-01-01 | End: 2023-01-01

## 2023-01-01 RX ORDER — ATORVASTATIN CALCIUM 80 MG/1
1 TABLET, FILM COATED ORAL
Qty: 0 | Refills: 0 | DISCHARGE

## 2023-01-01 RX ORDER — ACETAMINOPHEN 500 MG
650 TABLET ORAL EVERY 6 HOURS
Refills: 0 | Status: DISCONTINUED | OUTPATIENT
Start: 2023-01-01 | End: 2023-01-01

## 2023-01-01 RX ORDER — DIPHENHYDRAMINE HCL 50 MG
1 CAPSULE ORAL
Qty: 0 | Refills: 0 | DISCHARGE

## 2023-01-01 RX ORDER — SOD,AMMONIUM,POTASSIUM LACTATE
1 CREAM (GRAM) TOPICAL
Qty: 0 | Refills: 0 | DISCHARGE
Start: 2023-01-01

## 2023-01-01 RX ORDER — HYDRALAZINE HCL 50 MG
1 TABLET ORAL
Refills: 0 | DISCHARGE

## 2023-01-01 RX ORDER — CITALOPRAM 10 MG/1
10 TABLET, FILM COATED ORAL DAILY
Refills: 0 | Status: DISCONTINUED | OUTPATIENT
Start: 2023-01-01 | End: 2023-01-01

## 2023-01-01 RX ORDER — CEFTRIAXONE 500 MG/1
1000 INJECTION, POWDER, FOR SOLUTION INTRAMUSCULAR; INTRAVENOUS EVERY 24 HOURS
Refills: 0 | Status: COMPLETED | OUTPATIENT
Start: 2023-01-01 | End: 2023-01-01

## 2023-01-01 RX ORDER — AMLODIPINE BESYLATE 2.5 MG/1
1 TABLET ORAL
Qty: 0 | Refills: 0 | DISCHARGE
Start: 2023-01-01

## 2023-01-01 RX ORDER — MORPHINE SULFATE 50 MG/1
1.5 CAPSULE, EXTENDED RELEASE ORAL EVERY 6 HOURS
Refills: 0 | Status: DISCONTINUED | OUTPATIENT
Start: 2023-01-01 | End: 2023-01-01

## 2023-01-01 RX ORDER — PANTOPRAZOLE SODIUM 20 MG/1
40 TABLET, DELAYED RELEASE ORAL
Refills: 0 | Status: DISCONTINUED | OUTPATIENT
Start: 2023-01-01 | End: 2023-01-01

## 2023-01-01 RX ORDER — ACETAMINOPHEN 500 MG
1000 TABLET ORAL ONCE
Refills: 0 | Status: COMPLETED | OUTPATIENT
Start: 2023-01-01 | End: 2023-01-01

## 2023-01-01 RX ORDER — ONDANSETRON 8 MG/1
4 TABLET, FILM COATED ORAL EVERY 8 HOURS
Refills: 0 | Status: DISCONTINUED | OUTPATIENT
Start: 2023-01-01 | End: 2023-01-01

## 2023-01-01 RX ORDER — CITALOPRAM 10 MG/1
1 TABLET, FILM COATED ORAL
Qty: 0 | Refills: 0 | DISCHARGE
Start: 2023-01-01

## 2023-01-01 RX ORDER — SODIUM BICARBONATE 1 MEQ/ML
650 SYRINGE (ML) INTRAVENOUS THREE TIMES A DAY
Refills: 0 | Status: DISCONTINUED | OUTPATIENT
Start: 2023-01-01 | End: 2023-01-01

## 2023-01-01 RX ORDER — DOXAZOSIN MESYLATE 4 MG
1 TABLET ORAL
Qty: 0 | Refills: 0 | DISCHARGE

## 2023-01-01 RX ORDER — TETANUS TOXOID, REDUCED DIPHTHERIA TOXOID AND ACELLULAR PERTUSSIS VACCINE, ADSORBED 5; 2.5; 8; 8; 2.5 [IU]/.5ML; [IU]/.5ML; UG/.5ML; UG/.5ML; UG/.5ML
0.5 SUSPENSION INTRAMUSCULAR ONCE
Refills: 0 | Status: COMPLETED | OUTPATIENT
Start: 2023-01-01 | End: 2023-01-01

## 2023-01-01 RX ORDER — FUROSEMIDE 40 MG
40 TABLET ORAL ONCE
Refills: 0 | Status: COMPLETED | OUTPATIENT
Start: 2023-01-01 | End: 2023-01-01

## 2023-01-01 RX ORDER — SOD,AMMONIUM,POTASSIUM LACTATE
1 CREAM (GRAM) TOPICAL EVERY 12 HOURS
Refills: 0 | Status: DISCONTINUED | OUTPATIENT
Start: 2023-01-01 | End: 2023-01-01

## 2023-01-01 RX ORDER — DOXAZOSIN MESYLATE 4 MG
2 TABLET ORAL
Refills: 0 | Status: DISCONTINUED | OUTPATIENT
Start: 2023-01-01 | End: 2023-01-01

## 2023-01-01 RX ORDER — SODIUM CHLORIDE 9 MG/ML
1000 INJECTION, SOLUTION INTRAVENOUS
Refills: 0 | Status: DISCONTINUED | OUTPATIENT
Start: 2023-01-01 | End: 2023-01-01

## 2023-01-01 RX ORDER — FERROUS SULFATE 325(65) MG
1 TABLET ORAL
Refills: 0 | DISCHARGE

## 2023-01-01 RX ORDER — AMLODIPINE BESYLATE 2.5 MG/1
10 TABLET ORAL DAILY
Refills: 0 | Status: DISCONTINUED | OUTPATIENT
Start: 2023-01-01 | End: 2023-01-01

## 2023-01-01 RX ORDER — CHOLECALCIFEROL (VITAMIN D3) 125 MCG
1 CAPSULE ORAL
Qty: 0 | Refills: 0 | DISCHARGE

## 2023-01-01 RX ORDER — FUROSEMIDE 40 MG
1 TABLET ORAL
Qty: 0 | Refills: 0 | DISCHARGE

## 2023-01-01 RX ORDER — MEMANTINE HYDROCHLORIDE 5 MG/1
5 TABLET, FILM COATED ORAL
Qty: 60 | Refills: 3 | Status: DISCONTINUED | COMMUNITY
Start: 2021-01-28 | End: 2023-01-01

## 2023-01-01 RX ORDER — HYDRALAZINE HYDROCHLORIDE 50 MG/1
50 TABLET ORAL
Qty: 60 | Refills: 2 | Status: ACTIVE | COMMUNITY
Start: 2023-01-01 | End: 1900-01-01

## 2023-01-01 RX ORDER — CEFTRIAXONE 500 MG/1
1000 INJECTION, POWDER, FOR SOLUTION INTRAMUSCULAR; INTRAVENOUS ONCE
Refills: 0 | Status: COMPLETED | OUTPATIENT
Start: 2023-01-01 | End: 2023-01-01

## 2023-01-01 RX ORDER — ATORVASTATIN CALCIUM 80 MG/1
1 TABLET, FILM COATED ORAL
Qty: 0 | Refills: 0 | DISCHARGE
Start: 2023-01-01

## 2023-01-01 RX ORDER — IPRATROPIUM BROMIDE 0.2 MG/ML
500 SOLUTION, NON-ORAL INHALATION EVERY 6 HOURS
Refills: 0 | Status: DISCONTINUED | OUTPATIENT
Start: 2023-01-01 | End: 2023-01-01

## 2023-01-01 RX ORDER — METHOCARBAMOL 500 MG/1
500 TABLET, FILM COATED ORAL
Qty: 30 | Refills: 0 | Status: ACTIVE | COMMUNITY
Start: 2023-01-01 | End: 1900-01-01

## 2023-01-01 RX ORDER — MORPHINE SULFATE 50 MG/1
1 CAPSULE, EXTENDED RELEASE ORAL EVERY 6 HOURS
Refills: 0 | Status: DISCONTINUED | OUTPATIENT
Start: 2023-01-01 | End: 2023-01-01

## 2023-01-01 RX ADMIN — Medication 2 MILLIGRAM(S): at 17:39

## 2023-01-01 RX ADMIN — Medication 1 APPLICATION(S): at 09:03

## 2023-01-01 RX ADMIN — TETANUS TOXOID, REDUCED DIPHTHERIA TOXOID AND ACELLULAR PERTUSSIS VACCINE, ADSORBED 0.5 MILLILITER(S): 5; 2.5; 8; 8; 2.5 SUSPENSION INTRAMUSCULAR at 10:52

## 2023-01-01 RX ADMIN — CEFTRIAXONE 1000 MILLIGRAM(S): 500 INJECTION, POWDER, FOR SOLUTION INTRAMUSCULAR; INTRAVENOUS at 05:20

## 2023-01-01 RX ADMIN — Medication 650 MILLIGRAM(S): at 21:15

## 2023-01-01 RX ADMIN — HEPARIN SODIUM 5000 UNIT(S): 5000 INJECTION INTRAVENOUS; SUBCUTANEOUS at 20:28

## 2023-01-01 RX ADMIN — AMLODIPINE BESYLATE 10 MILLIGRAM(S): 2.5 TABLET ORAL at 09:32

## 2023-01-01 RX ADMIN — CEFTRIAXONE 1000 MILLIGRAM(S): 500 INJECTION, POWDER, FOR SOLUTION INTRAMUSCULAR; INTRAVENOUS at 05:32

## 2023-01-01 RX ADMIN — Medication 40 MILLIGRAM(S): at 12:14

## 2023-01-01 RX ADMIN — AMLODIPINE BESYLATE 10 MILLIGRAM(S): 2.5 TABLET ORAL at 09:03

## 2023-01-01 RX ADMIN — PANTOPRAZOLE SODIUM 40 MILLIGRAM(S): 20 TABLET, DELAYED RELEASE ORAL at 09:07

## 2023-01-01 RX ADMIN — AMLODIPINE BESYLATE 10 MILLIGRAM(S): 2.5 TABLET ORAL at 22:57

## 2023-01-01 RX ADMIN — PANTOPRAZOLE SODIUM 40 MILLIGRAM(S): 20 TABLET, DELAYED RELEASE ORAL at 09:12

## 2023-01-01 RX ADMIN — MORPHINE SULFATE 2 MILLIGRAM(S): 50 CAPSULE, EXTENDED RELEASE ORAL at 10:53

## 2023-01-01 RX ADMIN — SODIUM CHLORIDE 1000 MILLILITER(S): 9 INJECTION INTRAMUSCULAR; INTRAVENOUS; SUBCUTANEOUS at 14:01

## 2023-01-01 RX ADMIN — Medication 500 MICROGRAM(S): at 13:14

## 2023-01-01 RX ADMIN — Medication 40 MILLIGRAM(S): at 06:22

## 2023-01-01 RX ADMIN — Medication 650 MILLIGRAM(S): at 13:02

## 2023-01-01 RX ADMIN — AZITHROMYCIN 255 MILLIGRAM(S): 500 TABLET, FILM COATED ORAL at 23:16

## 2023-01-01 RX ADMIN — CITALOPRAM 10 MILLIGRAM(S): 10 TABLET, FILM COATED ORAL at 09:03

## 2023-01-01 RX ADMIN — CEFTRIAXONE 1000 MILLIGRAM(S): 500 INJECTION, POWDER, FOR SOLUTION INTRAMUSCULAR; INTRAVENOUS at 05:51

## 2023-01-01 RX ADMIN — Medication 1 APPLICATION(S): at 20:29

## 2023-01-01 RX ADMIN — Medication 650 MILLIGRAM(S): at 22:56

## 2023-01-01 RX ADMIN — HEPARIN SODIUM 5000 UNIT(S): 5000 INJECTION INTRAVENOUS; SUBCUTANEOUS at 09:33

## 2023-01-01 RX ADMIN — Medication 2 MILLIGRAM(S): at 09:32

## 2023-01-01 RX ADMIN — ATORVASTATIN CALCIUM 10 MILLIGRAM(S): 80 TABLET, FILM COATED ORAL at 20:28

## 2023-01-01 RX ADMIN — CEFTRIAXONE 1000 MILLIGRAM(S): 500 INJECTION, POWDER, FOR SOLUTION INTRAMUSCULAR; INTRAVENOUS at 16:26

## 2023-01-01 RX ADMIN — CITALOPRAM 10 MILLIGRAM(S): 10 TABLET, FILM COATED ORAL at 09:26

## 2023-01-01 RX ADMIN — AZITHROMYCIN 255 MILLIGRAM(S): 500 TABLET, FILM COATED ORAL at 16:25

## 2023-01-01 RX ADMIN — CEFTRIAXONE 1000 MILLIGRAM(S): 500 INJECTION, POWDER, FOR SOLUTION INTRAMUSCULAR; INTRAVENOUS at 23:16

## 2023-01-01 RX ADMIN — Medication 1 APPLICATION(S): at 09:27

## 2023-01-01 RX ADMIN — Medication 650 MILLIGRAM(S): at 09:33

## 2023-01-01 RX ADMIN — Medication 3 MILLILITER(S): at 08:31

## 2023-01-01 RX ADMIN — Medication 1 APPLICATION(S): at 21:15

## 2023-01-01 RX ADMIN — Medication 2 MILLIGRAM(S): at 22:55

## 2023-01-01 RX ADMIN — ATORVASTATIN CALCIUM 10 MILLIGRAM(S): 80 TABLET, FILM COATED ORAL at 21:15

## 2023-01-01 RX ADMIN — Medication 2 MILLIGRAM(S): at 17:06

## 2023-01-01 RX ADMIN — Medication 1 APPLICATION(S): at 09:33

## 2023-01-01 RX ADMIN — CITALOPRAM 10 MILLIGRAM(S): 10 TABLET, FILM COATED ORAL at 22:58

## 2023-01-01 RX ADMIN — SODIUM CHLORIDE 50 MILLILITER(S): 9 INJECTION, SOLUTION INTRAVENOUS at 05:20

## 2023-01-01 RX ADMIN — Medication 3 MILLILITER(S): at 10:34

## 2023-01-01 RX ADMIN — AMLODIPINE BESYLATE 10 MILLIGRAM(S): 2.5 TABLET ORAL at 09:26

## 2023-01-01 RX ADMIN — CEFTRIAXONE 1000 MILLIGRAM(S): 500 INJECTION, POWDER, FOR SOLUTION INTRAMUSCULAR; INTRAVENOUS at 23:09

## 2023-01-01 RX ADMIN — Medication 40 MILLIGRAM(S): at 06:55

## 2023-01-01 RX ADMIN — AZITHROMYCIN 255 MILLIGRAM(S): 500 TABLET, FILM COATED ORAL at 23:09

## 2023-01-01 RX ADMIN — CITALOPRAM 10 MILLIGRAM(S): 10 TABLET, FILM COATED ORAL at 09:33

## 2023-01-01 RX ADMIN — Medication 40 MILLIGRAM(S): at 23:08

## 2023-01-01 RX ADMIN — PANTOPRAZOLE SODIUM 40 MILLIGRAM(S): 20 TABLET, DELAYED RELEASE ORAL at 20:49

## 2023-01-01 RX ADMIN — Medication 400 MILLIGRAM(S): at 10:53

## 2023-01-01 RX ADMIN — Medication 3 MILLILITER(S): at 21:34

## 2023-01-01 RX ADMIN — PANTOPRAZOLE SODIUM 40 MILLIGRAM(S): 20 TABLET, DELAYED RELEASE ORAL at 23:08

## 2023-01-01 RX ADMIN — Medication 2 MILLIGRAM(S): at 09:03

## 2023-01-01 RX ADMIN — PANTOPRAZOLE SODIUM 40 MILLIGRAM(S): 20 TABLET, DELAYED RELEASE ORAL at 23:16

## 2023-01-01 RX ADMIN — AZITHROMYCIN 500 MILLIGRAM(S): 500 TABLET, FILM COATED ORAL at 16:33

## 2023-01-01 RX ADMIN — SODIUM CHLORIDE 50 MILLILITER(S): 9 INJECTION, SOLUTION INTRAVENOUS at 14:56

## 2023-01-01 RX ADMIN — Medication 650 MILLIGRAM(S): at 18:00

## 2023-01-01 RX ADMIN — Medication 2 MILLIGRAM(S): at 09:00

## 2023-01-01 RX ADMIN — ATORVASTATIN CALCIUM 10 MILLIGRAM(S): 80 TABLET, FILM COATED ORAL at 22:57

## 2023-01-01 RX ADMIN — HEPARIN SODIUM 5000 UNIT(S): 5000 INJECTION INTRAVENOUS; SUBCUTANEOUS at 09:03

## 2023-01-01 RX ADMIN — Medication 60 MILLIGRAM(S): at 18:06

## 2023-01-01 RX ADMIN — HEPARIN SODIUM 5000 UNIT(S): 5000 INJECTION INTRAVENOUS; SUBCUTANEOUS at 09:26

## 2023-01-01 RX ADMIN — HEPARIN SODIUM 5000 UNIT(S): 5000 INJECTION INTRAVENOUS; SUBCUTANEOUS at 21:14

## 2023-03-06 NOTE — ED ADULT NURSE NOTE - NSIMPLEMENTINTERV_GEN_ALL_ED
Implemented All Fall with Harm Risk Interventions:  [place] to call system. Call bell, personal items and telephone within reach. Instruct patient to call for assistance. Room bathroom lighting operational. Non-slip footwear when patient is off stretcher. Physically safe environment: no spills, clutter or unnecessary equipment. Stretcher in lowest position, wheels locked, appropriate side rails in place. Provide visual cue, wrist band, yellow gown, etc. Monitor gait and stability. Monitor for mental status changes and reorient to person, place, and time. Review medications for side effects contributing to fall risk. Reinforce activity limits and safety measures with patient and family. Provide visual clues: red socks.

## 2023-03-06 NOTE — ED ADULT NURSE NOTE - DOES PATIENT HAVE ADVANCE DIRECTIVE
CASE MANAGEMENT INITIAL ASSESSMENT    Admit Date:  2/25/2021     I spoke with patients significant other to discuss role of case management / discharge planning / team conference.     Patient is a  62 y.o. female transferred from [facility].    PLOF: Home and independent with significant other.    PCP: Needs  ADM MD: Maryana Flanagan  Consulting MD: Ismael Garay, Cardiology  EZEKIEL Jimenez, Neurology.    Diagnosis: Ischemic stroke ([facility]) [I63.9]    Co-morbidities:   Patient Active Problem List    Diagnosis Date Noted   • CVA (cerebral vascular accident) ([facility]) 02/20/2021   • Abnormal chest CT 02/20/2021   • Received intravenous tissue plasminogen activator (tPA) in emergency department 02/20/2021   • Pyelonephritis 12/02/2019   • Sepsis ([facility]) 12/02/2019   • Abnormal echocardiogram 02/21/2021   • Crohn disease ([facility]) 02/20/2021   • Vitamin D deficiency 04/20/2010   • Depression 03/17/2010   • Hypotension 02/26/2021   • Hyponatremia 02/26/2021   • Other hyperlipidemia 02/25/2021   • Acute systolic CHF (congestive heart failure) ([facility]) 02/25/2021   • Portal hypertension ([facility]) 02/25/2021   • Other ascites 02/25/2021   • Multiple thyroid nodules 02/24/2021   • Aphasia 02/24/2021   • Cardiomyopathy ([facility]) 02/22/2021   • Mitral stenosis 02/22/2021   • Methamphetamine abuse ([facility]) 02/21/2021   • Tobacco dependence 02/21/2021   • Acute cystitis without hematuria 02/21/2021   • Renal mass, right 10/04/2012   • Carpal tunnel syndrome 01/20/2012   • Chronic neck pain 01/20/2012   • Renal mass 01/20/2012   • Neuropathic pain of hand 10/21/2011   • Chronic back pain 10/21/2011   • Allergic rhinitis 10/21/2011   • Nausea & vomiting 10/21/2011   • Arthritis 10/05/2010     Prior Living Situation:  Housing / Facility: Mobile Home  Lives with - Patient's Self Care Capacity: Unable To Determine At This Time    Prior Level of Function:  Medication Management: Independent  Finances: Independent  Home Management: Independent  Shopping:  Other (Comments)(pt reported friend, Joni does shopping)  Prior Level Of Mobility: Independent Without Device in Community, Independent Without Device in Home  Driving / Transportation: Relatives / Others Provide Transportation(friend provides transportation)    Support Systems:  Primary : Joni Ruff-significant other  Advance Directives: No  Power of  (Name & Phone): None    Previous Services Utilized:   Equipment Owned: Unable to Determine At This Time(None per pt report)  Prior Services: Home-Independent    Other Information:  Occupation (Pre-Hospital Vocational): Unable To Determine At This Time  Pharmacy: [facility]  Primary Payor Source: Medicaid([facility])  Primary Care Practitioner : Needs  Other MDs: Consulted by EZEKIEL Jmienez (neuro) and Ismael Garay MD Cardiology    Additional Case Management Questions:  Have you ever received case management services for yourself or a family member? No    Do you feel you have and an understanding of what services  provide? Yes    Do you have any additional questions regarding case management? No         CASE MANAGEMENT PLAN OF CARE   Individualized Goals:   Patient / Family Goal:  per significant other 1. Take better care of self   2. Establish with a PCP   3. Get home ASAP      Barriers:   1. Functional limitation  2. Cognitive limitation  3. Hx of drug use    Plan:  1. Continue to follow patient through hospitalization and provide discharge planning in collaboration with patient, family, physicians and ancillary services.     2. Utilize community resources to ensure a safe discharge.        No

## 2023-03-06 NOTE — PHARMACOTHERAPY INTERVENTION NOTE - COMMENTS
Medication reconciliation completed.  Patient was unable to provide medication information, spoke to daughter Day at bedside and they provided current medication list; confirmed with Dr. [facility].

## 2023-03-06 NOTE — ED ADULT NURSE REASSESSMENT NOTE - NS ED NURSE REASSESS COMMENT FT1
Received report from RN Melinda. Pt resting comfortably in stretcher with daughter at bedside. Resp. even and unlabored. Daughter and pt refusing straight cath. VS as noted. Pending bed assignment. In NAD.

## 2023-03-06 NOTE — H&P ADULT - ASSESSMENT
Pt is admitted w/    FTT  Dehydration  Fatigue , weight loss   UTI, positive ua  Acute RF on CKD    , 4.55 increased from prior creatinine 2.17     - admit to medicine  - s/p Antibiotics in the ED  - hold lisinopril, lasix  - will discontinue imodium  - Nephrology consult   - DVT proph: heparin  - Full Code   Pt is admitted w/    FTT  Dehydration  Fatigue , weight loss   UTI, positive ua  Acute RF on CKD III    , 4.55 increased from prior creatinine 4.19    - admit to medicine  - s/p Antibiotics in the ED Rocephin, will cont  - hold  lasix  - will discontinue imodium  - Nutrition consult  - Nephrology consult   - DVT proph: heparin  - Full Code   Pt is admitted w/    FTT  Dehydration  Fatigue , weight loss   UTI, positive ua  Acute RF on CKD III    , 4.55 increased from prior creatinine 4.19    - admit to medicine  - s/p 1 L NS ,   Antibiotics with Rocephin, will cont x 3 days   - hold  lasix  - will discontinue imodium  - Nutrition consult  - Nephrology consult   - DVT proph: heparin  - Full Code  : daughter Day, HCP,  will discuss with family and further decide Advanced Directives tomorrow

## 2023-03-06 NOTE — H&P ADULT - HISTORY OF PRESENT ILLNESS
Pt is a pleasant 89 y/o male with a PMHx of anxiety, arrhythmia, dementia, HTN, prostate cancer, pure hypercholesterolemia presents to the ED c/o worsening generalized weakness x1 week. Hx limited due to dementia. Pt is a pleasant 89 y/o male with a PMHx of anxiety, HTN,  arrhythmia,  CKD IV , Dementia, prostate cancer, pure hypercholesterolemia,  HgA1C 5.8 (Oct 2022)  who presented  via EMS to [place]  ED c/o worsening generalized weakness x1 week.     Per EMS , family reported pt has not been drinking enough water and his urine was very dark and concentrated.   Pt denies chest pain/abd pain.  He is a poor historian due to  dementia.

## 2023-03-06 NOTE — PATIENT PROFILE ADULT - FALL HARM RISK - HARM RISK INTERVENTIONS
Assistance with ambulation/Assistance OOB with selected safe patient handling equipment/Communicate Risk of Fall with Harm to all staff/Discuss with provider need for PT consult/Monitor for mental status changes/Monitor gait and stability/Provide patient with walking aids - walker, cane, crutches/Reinforce activity limits and safety measures with patient and family/Reorient to person, place and time as needed/Review medications for side effects contributing to fall risk/Sit up slowly, dangle for a short time, stand at bedside before walking/Use of alarms - bed, chair and/or voice tab/Visual Cue: Yellow wristband and red socks/Bed in lowest position, wheels locked, appropriate side rails in place/Call bell, personal items and telephone in reach/Instruct patient to call for assistance before getting out of bed or chair/Non-slip footwear when patient is out of bed/[place] to call system/Physically safe environment - no spills, clutter or unnecessary equipment/Purposeful Proactive Rounding/Room/bathroom lighting operational, light cord in reach

## 2023-03-06 NOTE — H&P ADULT - NSHPPHYSICALEXAM_GEN_ALL_CORE
ICU Vital Signs Last 24 Hrs  T(C): 36.5 (06 Mar 2023 19:31), Max: 36.5 (06 Mar 2023 11:26)  T(F): 97.7 (06 Mar 2023 19:31), Max: 97.7 (06 Mar 2023 11:26)  HR: 75 (06 Mar 2023 19:31) (64 - 75)  BP: 174/61 (06 Mar 2023 19:31) (147/57 - 174/61)  BP(mean): 82 (06 Mar 2023 14:58) (82 - 82)    RR: 18 (06 Mar 2023 19:31) (18 - 18)  SpO2: 95% (06 Mar 2023 19:31) (95% - 100%)    O2 Parameters below as of 06 Mar 2023 19:31  Patient On (Oxygen Delivery Method): room air

## 2023-03-06 NOTE — PATIENT PROFILE ADULT - CAREGIVER NAME
Subjective     Kaley Hart is a 38 year old female who presents to clinic today for the following health issues:    HPI   New Patient/Transfer of Care      Please abstract the following data from this visit with this patient into the appropriate field in Epic:    Tests that can be patient reported without a hard copy:    Pap smear done on this date: 10/2017 (approximately), by this [facility], results were Normal        Kaley presents to the  clinic to establish care.  She recently moved back from [place] after living in [place] for 3 year.  She is currently being treated for primary hypothyroidism and is taking Levothyroxine 50 mcg.   She is not being treated for any other health  Problems.  She ran put of her medication a few days ago but has otherwise been consistent in taking this          Patient Active Problem List   Diagnosis     Contraceptive management     Other acne     Hematuria     Past Surgical History:   Procedure Laterality Date     NO HISTORY OF SURGERY         Social History     Tobacco Use     Smoking status: Never Smoker     Smokeless tobacco: Never Used   Substance Use Topics     Alcohol use: Yes     Comment: occasionally     Family History   Problem Relation Age of Onset     Other Cancer Mother         ovarian     Skin Cancer Mother      Diabetes Father          Current Outpatient Medications   Medication Sig Dispense Refill     calcium carbonate (OS-BRICE) 1500 (600 Ca) MG tablet Take by mouth 2 times daily (with meals)       levothyroxine (SYNTHROID/LEVOTHROID) 50 MCG tablet Take 1 tablet (50 mcg) by mouth daily 90 tablet 3     multivitamin, therapeutic with minerals (MULTI-VITAMIN) TABS Take 1 tablet by mouth daily       vitamin (B COMPLEX-C) tablet Take 1 tablet by mouth daily       vitamin D2 (ERGOCALCIFEROL) 10455 units (1250 mcg) capsule Take 50,000 Units by mouth once a week       Allergies   Allergen Reactions     Codeine Anaphylaxis     Seasonal Allergies Other (See  "Comments)         Reviewed and updated as needed this visit by Provider  Tobacco  Med Hx  Fam Hx  Soc Hx        Review of Systems   ROS COMP: Constitutional, HEENT, cardiovascular, pulmonary, gi and gu systems are negative, except as otherwise noted.      Objective    /70   Pulse 68   Temp 97.9  F (36.6  C) (Tympanic)   Ht 1.74 m (5' 8.5\")   Wt 64.9 kg (143 lb)   LMP 01/01/2020   SpO2 100%   BMI 21.43 kg/m    Body mass index is 21.43 kg/m .  Physical Exam   GENERAL: healthy, alert and no distress  RESP: lungs clear to auscultation - no rales, rhonchi or wheezes  CV: regular rate and rhythm, normal S1 S2, no S3 or S4, no murmur, click or rub, no peripheral edema  PSYCH: mentation appears normal, affect normal/bright    Diagnostic Test Results:  Labs reviewed in Epic        Assessment & Plan     1. Hypothyroidism, unspecified type   Recommend she continue Levothyroxine 50 mcg daily, refills sent.  Will recheck TSH in 6 weeks after consistent use.   - TSH with free T4 reflex; Future  - levothyroxine (SYNTHROID/LEVOTHROID) 50 MCG tablet; Take 1 tablet (50 mcg) by mouth daily  Dispense: 90 tablet; Refill: 3       Follow up as above    No follow-ups on file.    Surjit Ha PA-C  [facility]      " nicholas

## 2023-03-06 NOTE — PATIENT PROFILE ADULT - FUNCTIONAL ASSESSMENT - BASIC MOBILITY 6.
2-calculated by average/Not able to assess (calculate score using [facility] averaging method)

## 2023-03-06 NOTE — ED ADULT NURSE NOTE - NSFALLRSKINDICATORS_ED_ALL_ED
yes [Subsequent Evaluation] : a subsequent evaluation for [Other: _____] : [unfilled] [FreeTextEntry2] : follow up here for bilateral otalgia

## 2023-03-06 NOTE — ED ADULT TRIAGE NOTE - CHIEF COMPLAINT QUOTE
pt presents to ed via ems from home for evaluation of increasing weakness over the last week. per ems per family, pt is at baseline mental status. pt has hx of TIA, dementia, and is hard of hearing. pt poor historian

## 2023-03-06 NOTE — ED PROVIDER NOTE - OBJECTIVE STATEMENT
89 y/o male with a PMHx of anxiety, arrhythmia, dementia, HTN, prostate cancer, pure hypercholesterolemia presents to the ED c/o worsening generalized weakness x1 week. Hx limited due to dementia.

## 2023-03-07 NOTE — CONSULT NOTE ADULT - SUBJECTIVE AND OBJECTIVE BOX
NEPHROLOGY CONSULT  HPI:  Pt is a pleasant 89 y/o male with a PMHx of anxiety, HTN,  arrhythmia,  CKD IV , Dementia, prostate cancer, pure hypercholesterolemia,  HgA1C 5.8 (Oct 2022)  who presented  via EMS to [place]  ED c/o worsening generalized weakness x1 week.     Per EMS , family reported pt has not been drinking enough water and his urine was very dark and concentrated.   Pt denies chest pain/abd pain.  He is a poor historian due to  dementia. (06 Mar 2023 21:14)    Nephrology:  Pt evaluated case d/w pts care taker and daughter  Pt followed by Dr Hutchinson as outpt, Creat 4 range, determined not to dialyze due to advanced age and dementia  Pt was stable until recently had weakness, anorexia, not drinking, and diminished UO  In ED found to have azotemia, UTI and was hydrated w bolus 1 Liter in ED  Now has great appetite and eating and drinking as per pts daughter  creat w some improvement      PAST MEDICAL & SURGICAL HISTORY:  Arrhythmia      Pure hypercholesterolemia      Anxiety disorder      Dementia      Prostate cancer      Hypertension  was taken off medication by Dr Fischer       History of loop recorder  LINQ hear monitor placed      H/O left knee surgery      H/O right inguinal hernia repair          FAMILY HISTORY:  Family history of hypertension        MEDICATIONS  (STANDING):  amLODIPine   Tablet 10 milliGRAM(s) Oral daily  ammonium lactate 12% Lotion 1 Application(s) Topical every 12 hours  atorvastatin 10 milliGRAM(s) Oral at bedtime  cefTRIAXone Injectable. 1000 milliGRAM(s) IV Push every 24 hours  citalopram 10 milliGRAM(s) Oral daily  doxazosin 2 milliGRAM(s) Oral <User Schedule>  heparin   Injectable 5000 Unit(s) SubCutaneous every 12 hours  sodium bicarbonate 650 milliGRAM(s) Oral three times a day    MEDICATIONS  (PRN):      Allergies    No Known Allergies    Intolerances        I&O's Summary        REVIEW OF SYSTEMS:    UTO due to dementia    Vital Signs Last 24 Hrs  T(C): 36.8 (07 Mar 2023 15:30), Max: 37.2 (07 Mar 2023 08:10)  T(F): 98.2 (07 Mar 2023 15:30), Max: 99 (07 Mar 2023 08:10)  HR: 69 (07 Mar 2023 15:30) (69 - 75)  BP: 152/58 (07 Mar 2023 15:30) (140/55 - 152/58)  BP(mean): --  RR: 18 (07 Mar 2023 15:30) (18 - 18)  SpO2: 100% (07 Mar 2023 15:30) (96% - 100%)    Parameters below as of 07 Mar 2023 15:30  Patient On (Oxygen Delivery Method): room air        Daily     Daily     I&O's Summary      PHYSICAL EXAM:    General:  Alert, No acute distress.  pleasant    Neuro:  mild dementia, talkative    HEENT:  No JVD, no masses, Eyes anicteric, ,    Cardiovascular:  Regular rate and rhythm, with normal S1 and S2.    Lungs:  clear. no rales, no wheezing, .    Abdomen:  Normoactive bowel sounds. Soft, flat, non-tender, and non-distended.  positive bowel sounds    Skin:  Warm, dry    Extremities:  warm,  no cyanosis    bladder scan 150 ml    LABS:                        8.2    6.63  )-----------( 254      ( 07 Mar 2023 07:36 )             24.8     03-07    139  |  109<H>  |  73<H>  ----------------------------<  97  4.3   |  27  |  4.39<H>    Ca    8.7      07 Mar 2023 07:36    TPro  6.8  /  Alb  2.6<L>  /  TBili  0.5  /  DBili  x   /  AST  22  /  ALT  18  /  AlkPhos  68  03-06    PT/INR - ( 06 Mar 2023 12:02 )   PT: 12.6 sec;   INR: 1.09 ratio         PTT - ( 06 Mar 2023 12:02 )  PTT:20.7 sec  Urinalysis Basic - ( 06 Mar 2023 22:20 )    Color: Yellow / Appearance: Slightly Turbid / S.010 / pH: x  Gluc: x / Ketone: Negative  / Bili: Negative / Urobili: Negative   Blood: x / Protein: 100 / Nitrite: Negative   Leuk Esterase: Moderate / RBC: 3-5 /HPF / WBC 26-50 /HPF   Sq Epi: x / Non Sq Epi: Few / Bacteria: Many

## 2023-03-07 NOTE — PHYSICAL THERAPY INITIAL EVALUATION ADULT - GENERAL OBSERVATIONS, REHAB EVAL
Pt. received sitting on BSC + alarm speaks Urdu with some English. Sit to stand to RW with Mod A, Amb with RW Mod A slow with PT navigating RW 10 ft.

## 2023-03-07 NOTE — DIETITIAN INITIAL EVALUATION ADULT - OTHER INFO
Pt is a pleasant 89 y/o male with a PMHx of anxiety, HTN,  arrhythmia,  CKD IV , Dementia, prostate cancer, pure hypercholesterolemia,  HgA1C 5.8 (Oct 2022)  who presented  via EMS to [place]  ED c/o worsening generalized weakness x 1 week.  Per EMS , family reported pt has not been drinking enough water and his urine was very dark and concentrated.  Admit for FTT, dehydration, fatigue , weight loss, UTI, acute RF on CKD III    Is full code - as per MD, will discuss further w/ family about advanced directives. Consuming breakfast at time of visit and ate almost ~100% of tray (pancakes, oatmeal), aide reports good po intake since admit (x 1 day). Pending nephrology consult for DELMY on CKD. Bed scale wt of 117.6# taken by RD on 3/7/23. NFPE reveals moderate-severe muscle/ fat wasting - pt appears thin and yamini. Liberalize diet to regular to maximize caloric and nutrient intake. Add ensure + HP shake BID (350kcal, 20g protein) - pt is receptive. See below for other recommendations.

## 2023-03-07 NOTE — PROGRESS NOTE ADULT - NUTRITIONAL ASSESSMENT
This patient has been assessed with a concern for Malnutrition and has been determined to have a diagnosis/diagnoses of Severe protein-calorie malnutrition.    This patient is being managed with:   Diet DASH/TLC-  Sodium & Cholesterol Restricted  Entered: Mar  6 2023  3:23PM    The following pending diet order is being considered for treatment of Severe protein-calorie malnutrition:  Diet Regular-  Entered: Mar  7 2023 11:02AM

## 2023-03-07 NOTE — PHYSICAL THERAPY INITIAL EVALUATION ADULT - FOLLOWS COMMANDS/ANSWERS QUESTIONS, REHAB EVAL
Pt is mostly Armenian speaking, understands little english, Pt  was unable to use [place] /50% of the time/able to follow single-step instructions/unable to follow multi-step instructions

## 2023-03-07 NOTE — PHYSICAL THERAPY INITIAL EVALUATION ADULT - IMPAIRMENTS FOUND, PT EVAL
cognitive impairment/gait, locomotion, and balance/muscle strength/neuromotor development and sensory integration/poor safety awareness/posture

## 2023-03-07 NOTE — PHYSICAL THERAPY INITIAL EVALUATION ADULT - PERTINENT HX OF CURRENT PROBLEM, REHAB EVAL
Pt is a pleasant 89 y/o male with a PMHx of anxiety, HTN,  arrhythmia,  CKD IV , Dementia, prostate cancer, pure hypercholesterolemia,  HgA1C 5.8 (Oct 2022)  who presented  via EMS to [place]  ED c/o worsening generalized weakness x1 week. Family reported pt has not been drinking enough water and his urine was very dark and concentrated. Pt denies chest pain/abd pain. He is a poor historian due to dementia. chest x-ray (-). s/p 1 L NS , Antibiotics. Pt is a pleasant 89 y/o male s/p DELMY on CKD IV  with a PMHx of anxiety, HTN,  arrhythmia,  CKD IV , Dementia, prostate cancer, pure hypercholesterolemia,  HgA1C 5.8 (Oct 2022)  who presented  via EMS to [place]  ED c/o worsening generalized weakness x1 week. Family reported pt has not been drinking enough water and his urine was very dark and concentrated. Pt denies chest pain/abd pain. He is a poor historian due to dementia. chest x-ray (-). s/p 1 L NS , Antibiotics.

## 2023-03-07 NOTE — DIETITIAN NUTRITION RISK NOTIFICATION - ADDITIONAL COMMENTS/DIETITIAN RECOMMENDATIONS
1) Liberalize diet to regular to maximize caloric and nutrient intake.  2) Add ensure + HP shake TID (350kcal, 20g protein)  3) Monitor bowel movements, if no BM for >3 days, consider implementing bowel regimen.  4) MVI w/ minerals daily to ensure 100% RDA met  5) Consider adding thiamine 100 mg daily 2/2 poor PO intake/ malnutrition  6) Encourage protein-rich foods, maximize food preferences  7) Meal encouragement; supervision and total assistance w/ meals  8) Confirm goals of care regarding nutrition support  RD will continue to monitor PO intake, labs, hydration, and wt prn.

## 2023-03-07 NOTE — DIETITIAN INITIAL EVALUATION ADULT - PERTINENT LABORATORY DATA
03-07    139  |  109<H>  |  73<H>  ----------------------------<  97  4.3   |  27  |  4.39<H>    Ca    8.7      07 Mar 2023 07:36    TPro  6.8  /  Alb  2.6<L>  /  TBili  0.5  /  DBili  x   /  AST  22  /  ALT  18  /  AlkPhos  68  03-06

## 2023-03-07 NOTE — PHYSICAL THERAPY INITIAL EVALUATION ADULT - NSPTDISCHREC_GEN_A_CORE
After Visit Summary   12/14/2017    Alexandra Melgoza    MRN: [id number]           Patient Information     Date Of Birth          1993        Visit Information        Provider Department      12/14/2017 5:30 PM Marjorie López MD [facility]        Today's Diagnoses     Dysuria    -  1       Follow-ups after your visit        Your next 10 appointments already scheduled     Dec 14, 2017  6:15 PM CST   LAB with  LAB   [facility] ([facility])    [address] [place] [id number] [phone]           Please do not eat 10-12 hours before your appointment if you are coming in fasting for labs on lipids, cholesterol, or glucose (sugar). This does not apply to pregnant women. Water, hot tea and black coffee (with nothing added) are okay. Do not drink other fluids, diet soda or chew gum.              Future tests that were ordered for you today     Open Future Orders        Priority Expected Expires Ordered    UA with Micro reflex to Culture Routine 12/14/2017 12/14/2018 12/14/2017            Who to contact     Please call your clinic at [phone] to:    Ask questions about your health    Make or cancel appointments    Discuss your medicines    Learn about your test results    Speak to your doctor   If you have compliments or concerns about an experience at your clinic, or if you wish to file a complaint, please contact [facility] Patient Relations at [phone] or email us at [email or web address]         Additional Information About Your Visit        MyChart Information     [employer] gives you secure access to your electronic health record. If you see a primary care provider, you can also send messages to your care team and make appointments. If you have questions, please call your primary care clinic.  If you do not have a primary care provider, please call [phone] and they  will assist you.      [employer] is an electronic gateway that provides easy, online access to your medical records. With [employer], you can request a clinic appointment, read your test results, renew a prescription or communicate with your care team.     To access your existing account, please contact your [facility] or call [phone] for assistance.        Care EveryWhere ID     This is your Care EveryWhere ID. This could be used by other organizations to access your [facility] medical records  [id number]        Your Vitals Were     Pulse BMI (Body Mass Index)                96 22.81 kg/m2           Blood Pressure from Last 3 Encounters:   12/14/17 116/77   12/13/17 102/56   11/24/17 117/80    Weight from Last 3 Encounters:   12/14/17 65.7 kg (144 lb 12.8 oz)   12/12/17 65.2 kg (143 lb 12.8 oz)   11/22/17 67.7 kg (149 lb 3.2 oz)                 Today's Medication Changes          These changes are accurate as of: 12/14/17  5:40 PM.  If you have any questions, ask your nurse or doctor.               These medicines have changed or have updated prescriptions.        Dose/Directions    polyethylene glycol Packet   Commonly known as:  MIRALAX/GLYCOLAX   This may have changed:  Another medication with the same name was removed. Continue taking this medication, and follow the directions you see here.   Used for:  Right upper quadrant abdominal pain        Dose:  17 g   Take 17 g by mouth daily   Quantity:  7 packet   Refills:  0                Primary Care Provider Office Phone # Fax #    EastonCheyanne Baez [phone]       [address] [place] [id number]        Equal Access to Services     [facility]: Hadii angelina joseph Somalini, waaxda luqadaha, qaybta kaalmada maki santillan. So [facility] [phone].    ATENCIÓN: Si habla español, tiene a quijano disposición servicios gratuitos de asistencia lingüística. Llame al  [phone].    We comply with applicable federal civil rights laws and [place] laws. We do not discriminate on the basis of race, color, national origin, age, disability, sex, sexual orientation, or gender identity.            Thank you!     Thank you for choosing [facility]  for your care. Our goal is always to provide you with excellent care. Hearing back from our patients is one way we can continue to improve our services. Please take a few minutes to complete the written survey that you may receive in the mail after your visit with us. Thank you!             Your Updated Medication List - Protect others around you: Learn how to safely use, store and throw away your medicines at www.disposemymeds.org.          This list is accurate as of: 12/14/17  5:40 PM.  Always use your most recent med list.                   Brand Name Dispense Instructions for use Diagnosis    * amylase-lipase-protease 44038 UNITS Cpep    ZENPEP    180 capsule    Take 2-3 capsules (40,000-60,000 Units) by mouth Take with snacks or supplements (Snacks)    Idiopathic chronic pancreatitis (H)       * amylase-lipase-protease 07712 UNITS Cpep    ZENPEP    180 capsule    Take 5 capsules (100,000 Units) by mouth 3 times daily (with meals)    Right upper quadrant abdominal pain       fluticasone 50 MCG/ACT spray    FLONASE    16 g    Spray 2 sprays into both nostrils daily    Nasal congestion       gabapentin 300 MG capsule    NEURONTIN    90 capsule    Take 1 capsule (300 mg) by mouth 3 times daily    Chronic abdominal pain, Sphincter of Oddi dysfunction, Recurring abdominal pain, Somatoform disorder       leuprolide 11.25 MG kit    LUPRON DEPOT (3-MONTH)    1 each    Inject 11.25 mg into the muscle every 3 months    Endometriosis       levalbuterol 45 MCG/ACT Inhaler    XOPENEX HFA    1 Inhaler    Inhale 2 puffs into the lungs every 6 hours as needed for shortness of breath / dyspnea    Wheeze       norethindrone 5 MG tablet     AYGESTIN    90 tablet    Take 1 tablet (5 mg) by mouth daily    Endometriosis       nortriptyline 10 MG capsule    PAMELOR    120 capsule    Take 3 capsules (30 mg) by mouth At Bedtime    Right upper quadrant abdominal pain       ondansetron 4 MG ODT tab    ZOFRAN ODT    40 tablet    Take 1 tablet (4 mg) by mouth every 8 hours as needed for nausea or vomiting    Idiopathic chronic pancreatitis (H)       oxyCODONE 5 MG/5ML solution    ROXICODONE    900 mL    Take 10-15 mLs (10-15 mg) by mouth every 4 hours as needed for moderate to severe pain    Right upper quadrant abdominal pain       pantoprazole 40 MG EC tablet    PROTONIX    30 tablet    Take 1 tablet (40 mg) by mouth 2 times daily (before meals)    Right upper quadrant abdominal pain, Erosive gastritis       polyethylene glycol Packet    MIRALAX/GLYCOLAX    7 packet    Take 17 g by mouth daily    Right upper quadrant abdominal pain       RIZATRIPTAN BENZOATE PO      Take 5 mg by mouth once as needed        senna-docusate 8.6-50 MG per tablet    SENOKOT-S;PERICOLACE    100 tablet    Take 1 tablet by mouth 2 times daily as needed for constipation    Right upper quadrant abdominal pain       * Notice:  This list has 2 medication(s) that are the same as other medications prescribed for you. Read the directions carefully, and ask your doctor or other care provider to review them with you.       [facility] Home PT with 24/7 care/Sub-acute Rehab

## 2023-03-07 NOTE — CONSULT NOTE ADULT - ASSESSMENT
Pt is a pleasant 89 y/o male with a PMHx of anxiety, HTN,  arrhythmia,  CKD IV , Dementia, prostate cancer, pure hypercholesterolemia,  HgA1C 5.8 (Oct 2022)  who presented  via EMS to [place]  ED c/o worsening generalized weakness x1 week.     Per EMS , family reported pt has not been drinking enough water and his urine was very dark and concentrated.   Pt denies chest pain/abd pain.  He is a poor historian due to  dementia. (06 Mar 2023 21:14)    Nephrology:  Pt evaluated case d/w pts care taker and daughter  Pt followed by Dr Hutchinson as outpt, Creat 4 range, determined not to dialyze due to advanced age and dementia  Pt was stable until recently had weakness, anorexia, not drinking, and diminished UO  In ED found to have azotemia, UTI and was hydrated w bolus 1 Liter in ED  Now has great appetite and eating and drinking as per pts daughter  creat w some improvement    A/P  CKD4-5  DELMY, dehydration due to UTI and decompensation  As per daughter doing very well with great appetite  Will maintain on PO fluids  ABX  DC NaHCO3 for now , bicarb 27

## 2023-03-07 NOTE — DIETITIAN INITIAL EVALUATION ADULT - ORAL INTAKE PTA/DIET HISTORY
Pt lives at home w/ daughter. English and Kiswahili speaking - unable to obtain information 2/2 dementia

## 2023-03-08 NOTE — PROGRESS NOTE ADULT - ASSESSMENT
89/M admitted w/    #Acute Toxic Encephalopathy + UTI:  -f/u on culture   -cont Rocephin     #DELMY in CKD  4-5 :  -baseline Cr?  -oral hydration   -strict i/o's  -hold nephrotoxic medications: HOLD LASIX  -nephrology consult appreciated    FTT, Dehydration  -Nutrition consult     VTE Prophylaxis  -Heparin     #Advanced Care Directives   -FULL CODE     Dispo: f/u urine cx
Pt is admitted w/    #Acute Toxic Encephalopathy  -UA: reviewed   -f/u on culture   -Rocephin     #DELMY in CKD   -baseline Cr?  -IV hydration   -strict i/o's  -hold nephrotoxic medications: HOLD LASIX  -nephrology consult    FTT, Dehydration  -Nutrition consult     VTE Prophylaxis  -Heparin     #Advanced Care Directives   -FULL CODE     
Pt is a pleasant 89 y/o male with a PMHx of anxiety, HTN,  arrhythmia,  CKD IV , Dementia, prostate cancer, pure hypercholesterolemia,  HgA1C 5.8 (Oct 2022)  who presented  via EMS to [place]  ED c/o worsening generalized weakness x1 week.     Per EMS , family reported pt has not been drinking enough water and his urine was very dark and concentrated.   Pt denies chest pain/abd pain.  He is a poor historian due to  dementia. 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

## 2023-03-08 NOTE — PROGRESS NOTE ADULT - NUTRITIONAL ASSESSMENT
This patient has been assessed with a concern for Malnutrition and has been determined to have a diagnosis/diagnoses of Severe protein-calorie malnutrition.    This patient is being managed with:   Diet Regular-  Entered: Mar  7 2023 11:02AM

## 2023-03-08 NOTE — PROGRESS NOTE ADULT - REASON FOR ADMISSION
FTT  Dehydration  Weakness  DELMY

## 2023-03-08 NOTE — PROGRESS NOTE ADULT - SUBJECTIVE AND OBJECTIVE BOX
NEPHROLOGY INTERVAL HPI/OVERNIGHT EVENTS:  23 @ 14:33    3/8 lunch tray at bedside, 1/3 eaten comfortable   HPI:  Pt is a pleasant 89 y/o male with a PMHx of anxiety, HTN,  arrhythmia,  CKD IV , Dementia, prostate cancer, pure hypercholesterolemia,  HgA1C 5.8 (Oct 2022)  who presented  via EMS to [place]  ED c/o worsening generalized weakness x1 week.     Per EMS , family reported pt has not been drinking enough water and his urine was very dark and concentrated.   Pt denies chest pain/abd pain.  He is a poor historian due to  dementia. (06 Mar 2023 21:14)    Nephrology:  Pt evaluated case d/w pts care taker and daughter  Pt followed by Dr Hutchinson as outpt, Creat 4 range, determined not to dialyze due to advanced age and dementia  Pt was stable until recently had weakness, anorexia, not drinking, and diminished UO  In ED found to have azotemia, UTI and was hydrated w bolus 1 Liter in ED  Now has great appetite and eating and drinking as per pts daughter  creat w some improvement      MEDICATIONS  (STANDING):  amLODIPine   Tablet 10 milliGRAM(s) Oral daily  ammonium lactate 12% Lotion 1 Application(s) Topical every 12 hours  atorvastatin 10 milliGRAM(s) Oral at bedtime  cefTRIAXone Injectable. 1000 milliGRAM(s) IV Push every 24 hours  citalopram 10 milliGRAM(s) Oral daily  doxazosin 2 milliGRAM(s) Oral <User Schedule>  heparin   Injectable 5000 Unit(s) SubCutaneous every 12 hours    MEDICATIONS  (PRN):      Allergies    No Known Allergies    Intolerances        I&O's Detail      .        Vital Signs Last 24 Hrs  T(C): 36.5 (08 Mar 2023 08:11), Max: 36.8 (07 Mar 2023 15:30)  T(F): 97.7 (08 Mar 2023 08:11), Max: 98.2 (07 Mar 2023 15:30)  HR: 66 (08 Mar 2023 08:11) (66 - 77)  BP: 146/78 (08 Mar 2023 08:11) (137/56 - 152/58)  BP(mean): --  RR: 18 (08 Mar 2023 08:11) (18 - 18)  SpO2: 97% (08 Mar 2023 08:11) (97% - 100%)    Parameters below as of 08 Mar 2023 08:11  Patient On (Oxygen Delivery Method): room air      Daily     Daily     PHYSICAL EXAM:  General: alert. awake NAD  HEENT: MMM  CV: s1s2 rrr  LUNGS: B/L CTA  EXT: no edema    LABS:                        7.7    6.50  )-----------( 278      ( 08 Mar 2023 08:23 )             23.7     03-08    140  |  109<H>  |  73<H>  ----------------------------<  92  4.1   |  25  |  4.73<H>    Ca    8.6      08 Mar 2023 08:23        Urinalysis Basic - ( 06 Mar 2023 22:20 )    Color: Yellow / Appearance: Slightly Turbid / S.010 / pH: x  Gluc: x / Ketone: Negative  / Bili: Negative / Urobili: Negative   Blood: x / Protein: 100 / Nitrite: Negative   Leuk Esterase: Moderate / RBC: 3-5 /HPF / WBC 26-50 /HPF   Sq Epi: x / Non Sq Epi: Few / Bacteria: Many          
HOSPITALIST ATTENDING PROGRESS NOTE     Chart and meds reviewed. Patient seen and examined     CC: FTT, UTI    3/8: no complaints   Hb 7.7  u cx awaited      PHYSICAL EXAM:    Daily     Daily     Vital Signs Last 24 Hrs  T(C): 36.5 (08 Mar 2023 08:11), Max: 36.8 (07 Mar 2023 15:30)  T(F): 97.7 (08 Mar 2023 08:11), Max: 98.2 (07 Mar 2023 15:30)  HR: 66 (08 Mar 2023 08:11) (66 - 77)  BP: 146/78 (08 Mar 2023 08:11) (137/56 - 152/58)  BP(mean): --  RR: 18 (08 Mar 2023 08:11) (18 - 18)  SpO2: 97% (08 Mar 2023 08:11) (97% - 100%)    Constitutional: Weak and ill appearing  HEENT: Atraumatic, JADEN,   Respiratory: Breath Sounds normal, no rhonchi/wheeze  Cardiovascular: N S1S2;   Gastrointestinal: Abdomen soft, non tender, Bowel Sounds present  Extremities: No edema, peripheral pulses present  Neurological: AAO x 0, no gross focal motor deficits  Skin: Non cellulitic, no rash, ulcers  Lymph Nodes: No lymphadenopathy noted  Back: No CVA tenderness   Musculoskeletal: non tender  Breasts: Deferred  Genitourinary: deferred  Rectal: Deferred    All Labs/EKG/Radiology/Meds reviewed by me                          7.7    6.50  )-----------( 278      ( 08 Mar 2023 08:23 )             23.7       CBC Full  -  ( 08 Mar 2023 08:23 )  WBC Count : 6.50 K/uL  RBC Count : 2.55 M/uL  Hemoglobin : 7.7 g/dL  Hematocrit : 23.7 %  Platelet Count - Automated : 278 K/uL  Mean Cell Volume : 92.9 fl  Mean Cell Hemoglobin : 30.2 pg  Mean Cell Hemoglobin Concentration : 32.5 gm/dL  Auto Neutrophil # : x  Auto Lymphocyte # : x  Auto Monocyte # : x  Auto Eosinophil # : x  Auto Basophil # : x  Auto Neutrophil % : x  Auto Lymphocyte % : x  Auto Monocyte % : x  Auto Eosinophil % : x  Auto Basophil % : x      03-08    140  |  109<H>  |  73<H>  ----------------------------<  92  4.1   |  25  |  4.73<H>    Ca    8.6      08 Mar 2023 08:23    TPro  6.8  /  Alb  2.6<L>  /  TBili  0.5  /  DBili  x   /  AST  22  /  ALT  18  /  AlkPhos  68  03-06      LIVER FUNCTIONS - ( 06 Mar 2023 12:02 )  Alb: 2.6 g/dL / Pro: 6.8 gm/dL / ALK PHOS: 68 U/L / ALT: 18 U/L / AST: 22 U/L / GGT: x             PT/INR - ( 06 Mar 2023 12:02 )   PT: 12.6 sec;   INR: 1.09 ratio         PTT - ( 06 Mar 2023 12:02 )  PTT:20.7 sec          Urinalysis Basic - ( 06 Mar 2023 22:20 )    Color: Yellow / Appearance: Slightly Turbid / S.010 / pH: x  Gluc: x / Ketone: Negative  / Bili: Negative / Urobili: Negative   Blood: x / Protein: 100 / Nitrite: Negative   Leuk Esterase: Moderate / RBC: 3-5 /HPF / WBC 26-50 /HPF   Sq Epi: x / Non Sq Epi: Few / Bacteria: Many            MEDICATIONS  (STANDING):  amLODIPine   Tablet 10 milliGRAM(s) Oral daily  ammonium lactate 12% Lotion 1 Application(s) Topical every 12 hours  atorvastatin 10 milliGRAM(s) Oral at bedtime  cefTRIAXone Injectable. 1000 milliGRAM(s) IV Push every 24 hours  citalopram 10 milliGRAM(s) Oral daily  doxazosin 2 milliGRAM(s) Oral <User Schedule>  heparin   Injectable 5000 Unit(s) SubCutaneous every 12 hours    MEDICATIONS  (PRN):        
HOSPITALIST ATTENDING PROGRESS NOTE     Chart and meds reviewed. Patient seen and examined     CC: FTT, UTI    Subjective: Sitting chair. Has no acute complaints. Vitals stable.       All other systems and founds to be negative with exception of what has been described above.         PHYSICAL EXAM:  Vital Signs Last 24 Hrs  T(C): 36.8 (07 Mar 2023 15:30), Max: 37.2 (07 Mar 2023 08:10)  T(F): 98.2 (07 Mar 2023 15:30), Max: 99 (07 Mar 2023 08:10)  HR: 69 (07 Mar 2023 15:30) (69 - 75)  BP: 152/58 (07 Mar 2023 15:30) (140/55 - 174/61)  RR: 18 (07 Mar 2023 15:30) (18 - 18)  SpO2: 100% (07 Mar 2023 15:30) (95% - 100%)    Parameters below as of 07 Mar 2023 15:30  Patient On (Oxygen Delivery Method): room air        GEN: NAD, +cachectic   HEENT: EOMI, normal hearing, moist mucous membranes  NECK : Soft and supple, no JVD  LUNG: CTABL, No wheezing, rales or rhonchi  CVS: S1S2+, RRR, no M/G/R  GI: BS+, soft, NT/ND, no guarding, no rebound  EXTREMITIES: No peripheral edema  VASCULAR: 2+ peripheral pulses  NEURO: AAOx1, grossly non-focal   SKIN: No rashes    MEDICATIONS:  MEDICATIONS  (STANDING):  amLODIPine   Tablet 10 milliGRAM(s) Oral daily  ammonium lactate 12% Lotion 1 Application(s) Topical every 12 hours  atorvastatin 10 milliGRAM(s) Oral at bedtime  cefTRIAXone Injectable. 1000 milliGRAM(s) IV Push every 24 hours  citalopram 10 milliGRAM(s) Oral daily  doxazosin 2 milliGRAM(s) Oral <User Schedule>  heparin   Injectable 5000 Unit(s) SubCutaneous every 12 hours  sodium bicarbonate 650 milliGRAM(s) Oral three times a day      LABS: All Labs Reviewed:                        8.2    6.63  )-----------( 254      ( 07 Mar 2023 07:36 )             24.8     03-07    139  |  109<H>  |  73<H>  ----------------------------<  97  4.3   |  27  |  4.39<H>    Ca    8.7      07 Mar 2023 07:36    TPro  6.8  /  Alb  2.6<L>  /  TBili  0.5  /  DBili  x   /  AST  22  /  ALT  18  /  AlkPhos  68  03-06    PT/INR - ( 06 Mar 2023 12:02 )   PT: 12.6 sec;   INR: 1.09 ratio         PTT - ( 06 Mar 2023 12:02 )  PTT:20.7 sec      Blood Culture:   I&O's Summary    CAPILLARY BLOOD GLUCOSE          RADIOLOGY/EKG:  < from: CT Head No Cont (03.06.23 @ 13:11) >  FINDINGS:    No hydrocephalus, mass effect, midline shift, acute intracranial   hemorrhage, or brain edema.    Extensive white matter microvascular ischemic disease.    Visualized paranasal sinuses and mastoid air cells are clear.    IMPRESSION:    No significant interval change.    No hydrocephalus, acute intracranial hemorrhage, mass effect, or brain   edema.    Extensive white matter microvascular ischemic disease.    < end of copied text >

## 2023-03-09 NOTE — DISCHARGE NOTE PROVIDER - NSDCCPCAREPLAN_GEN_ALL_CORE_FT
PRINCIPAL DISCHARGE DIAGNOSIS  Diagnosis: Weakness  Assessment and Plan of Treatment: due to uti  better      SECONDARY DISCHARGE DIAGNOSES  Diagnosis: Acute UTI  Assessment and Plan of Treatment: aerococcus uti  treated with iv ceftriaxone    Diagnosis: Stage 4 chronic kidney disease  Assessment and Plan of Treatment: f/u with nephrologist

## 2023-03-09 NOTE — DISCHARGE NOTE PROVIDER - CARE PROVIDER_API CALL
Chirag Quesada  [facility]  [address] [place] [address]  Phone: [phone]  Fax: [phone]  Follow Up Time:     Sandra Begum)  Internal Medicine; Nephrology  [address] [facility], [address]  [place] [address]  Phone: [phone]  Fax: [phone]  Follow Up Time:

## 2023-03-09 NOTE — DISCHARGE NOTE PROVIDER - NSDCMRMEDTOKEN_GEN_ALL_CORE_FT
amLODIPine 10 mg oral tablet: 1 tab(s) orally once a day  ammonium lactate 12% topical lotion: 1 application topically every 12 hours  atorvastatin 10 mg oral tablet: 1 tab(s) orally once a day (at bedtime)  citalopram 10 mg oral tablet: 1 tab(s) orally once a day  doxazosin 2 mg oral tablet: 1 tab(s) orally 2 times a day

## 2023-03-09 NOTE — DISCHARGE NOTE PROVIDER - HOSPITAL COURSE
PHYSICAL EXAM:    Daily     Daily     Vital Signs Last 24 Hrs  T(C): 36.6 (09 Mar 2023 08:16), Max: 36.6 (09 Mar 2023 08:16)  T(F): 97.9 (09 Mar 2023 08:16), Max: 97.9 (09 Mar 2023 08:16)  HR: 61 (09 Mar 2023 08:16) (61 - 64)  BP: 156/55 (09 Mar 2023 08:16) (147/55 - 156/70)  BP(mean): --  RR: 18 (09 Mar 2023 08:16) (17 - 18)  SpO2: 97% (09 Mar 2023 08:16) (96% - 100%)    Constitutional: Weak  appearing  HEENT: Atraumatic, JADEN, Normal, No congestion  Respiratory: Breath Sounds normal, no rhonchi/wheeze  Cardiovascular: N S1S2;   Gastrointestinal: Abdomen soft, non tender, Bowel Sounds present  Extremities: No edema, peripheral pulses present  Neurological: AAO x 1, no gross focal motor deficits  Skin: Non cellulitic, no rash, ulcers  Lymph Nodes: No lymphadenopathy noted  Back: No CVA tenderness   Musculoskeletal: non tender  Breasts: Deferred  Genitourinary: deferred  Rectal: Deferred    89/M admitted w/    #Acute Toxic Encephalopathy + UTI:  -f/u on culture , aerococcus; spoke with Dr. Andrews; pt treated with 3 days of iv ceftriaxone; no more ABX needed at this time.   -completed Rocephin     #DELMY in CKD  4-5 :  -baseline Cr?  -oral hydration   -hold nephrotoxic medications: HOLD LASIX  -nephrology consult appreciated  f/u with PCP/nephro as out pt    FTT, Dehydration  -Nutrition consult     #Advanced Care Directives   -FULL CODE     Dispo: d/c to [facility]    time spent 42 min

## 2023-03-09 NOTE — DISCHARGE NOTE PROVIDER - DETAILS OF MALNUTRITION DIAGNOSIS/DIAGNOSES
This patient has been assessed with a concern for Malnutrition and was treated during this hospitalization for the following Nutrition diagnosis/diagnoses:     -  03/07/2023: Severe protein-calorie malnutrition

## 2023-03-09 NOTE — DISCHARGE NOTE NURSING/CASE MANAGEMENT/SOCIAL WORK - PATIENT PORTAL LINK FT
You can access the FollowMyHealth Patient Portal offered by [facility] by registering at the following website: [email or web address]. By joining [employer]’s FollowMyHealth portal, you will also be able to view your health information using other applications (apps) compatible with our system.

## 2023-07-08 NOTE — ED PROVIDER NOTE - PHYSICAL EXAMINATION
GEN - NAD; well appearing; A+O x1  HEAD - NC/AT     EYES - EOMI, no conjunctival pallor, no scleral icterus  ENT -   mucous membranes  moist , no discharge      NECK - Neck supple  PULM - CTA b/l,  symmetric breath sounds  COR -  RRR, S1 S2, no murmurs  ABD - , ND, NT, soft, no guarding, no rebound, no masses    BACK - no CVA tenderness, nontender spine     EXTREMS - no edema, no deformity, warm and well perfused    SKIN - Abrasion to right side of forehead  NEUROLOGIC - alert, sensation nl, motor 5/5 RUE/LUE/RLE/LLE

## 2023-07-08 NOTE — ED ADULT NURSE NOTE - NSFALLHARMRISKINTERV_ED_ALL_ED
Communicate risk of Fall with Harm to all staff, patient, and family/Provide patient with walking aids/Provide visual cue: red socks, yellow wristband, yellow gown, etc/Reinforce activity limits and safety measures with patient and family/Bed in lowest position, wheels locked, appropriate side rails in place/Call bell, personal items and telephone in reach/Instruct patient to call for assistance before getting out of bed/chair/stretcher/Non-slip footwear applied when patient is off stretcher/[place] to call system/Physically safe environment - no spills, clutter or unnecessary equipment/Purposeful Proactive Rounding/Room/bathroom lighting operational, light cord in reach

## 2023-07-08 NOTE — ED ADULT TRIAGE NOTE - CHIEF COMPLAINT QUOTE
Pt presents to ER s/p unwitnessed fall out of bed this morning. Pt daughter heard him fall responded immediately. Pt hit the right side of his head on dressing, hematoma on right forehead; skin intact. No bloodthinners

## 2023-07-08 NOTE — CONSULT NOTE ADULT - SUBJECTIVE AND OBJECTIVE BOX
89y Male w/PMHx of anxiety, HTN, arrhythmia, CKD IV, Dementia, prostate CA, and pure hypercholesterolemia presents to the ED s/p fall. Per daughter patient fell off the bed and sits on edge and rolled off. Patient AO x1. Patient reporting no pain on examination. Unable to relay acute complaints.         PAST MEDICAL & SURGICAL HISTORY:  Arrhythmia      Pure hypercholesterolemia      Anxiety disorder      Dementia      Prostate cancer      Hypertension  was taken off medication by Dr Fischer 2016      History of loop recorder  LINQ hear monitor placed      H/O left knee surgery      H/O right inguinal hernia repair        MEDICATIONS  (STANDING):    MEDICATIONS  (PRN):    Allergies    No Known Allergies    Intolerances        T(C): 36.6 (07-08-23 @ 09:09), Max: 36.6 (07-08-23 @ 09:09)  HR: 64 (07-08-23 @ 09:09) (64 - 64)  BP: 151/47 (07-08-23 @ 09:09) (151/47 - 151/47)  RR: 17 (07-08-23 @ 09:09) (17 - 17)  SpO2: 96% (07-08-23 @ 09:09) (96% - 96%)  Wt(kg): --    PE   R LE:  AOx1  Skin intact; No ecchymosis/soft tissue swelling  Compartments soft; - TTP about hip. No TTP to knee/leg/ankle/foot   able to SLR without pain, no pain with axial loading, no pain with log rolling  Motor intact GS/TA/FHL/EHL  SILT L2-S1  DP/PT pulses 2+    SECONDARY EXAM: Benign, Skin intact, SILT throughout, motor grossly intact throughout, no other orthopedic injuries at this time, compartments soft and compressible    SPINE: Skin intact, no bony tenderness or step-offs appreciated throughout cervical/thoracic/lumbar/sacral spine    BL UE: Skin intact, no erythema, ecchymosis, edema, gross deformity, NTTP over the bony prominences of the shoulder/elbow/wrist/hand, painless passive/active ROM of the shoulder/elbow/wrist/hand, C5-T1 SILT, motor grossly intact throughout axillary/musculocutaenous/radial/median/ulnar nerves, + radial pulse    L LE: Skin intact, no erythema, ecchymosis, edema, gross deformity, NTTP over the bony prominences of the hip/knee/ankle/foot, painless passive/active ROM of the hip/knee/ankle/foot, L2-S1 SILT, motor grossly intact throughout hip flexors/quads/hams/TA/EHL/FHL/GSC, + DP/PT pulses, no pain with log roll, no pain on axial loading, compartments soft and compressible, calves nontender      Imaging:  XR demonstrating _ femoral neck/intertroch/subtroch fracture    89y Male with femoral neck intertroch subtroch fracture  - Pain control  - NPO/IVF  - Rendon catheter  - CBC/BMP/Coags/UA/T+S x2  - EKG/CXR  - Medical clearance  - Plan for OR for ORIF 89y Male w/PMHx of anxiety, HTN, arrhythmia, CKD IV, Dementia, prostate CA, and pure hypercholesterolemia presents to the ED s/p fall. Per daughter patient fell off the bed and sits on edge and rolled off. Patient AO x1. Patient reporting no pain on examination. Unable to relay acute complaints.         PAST MEDICAL & SURGICAL HISTORY:  Arrhythmia      Pure hypercholesterolemia      Anxiety disorder      Dementia      Prostate cancer      Hypertension  was taken off medication by Dr Fischer 2016      History of loop recorder  LINQ hear monitor placed      H/O left knee surgery      H/O right inguinal hernia repair        MEDICATIONS  (STANDING):    MEDICATIONS  (PRN):    Allergies    No Known Allergies    Intolerances        T(C): 36.6 (07-08-23 @ 09:09), Max: 36.6 (07-08-23 @ 09:09)  HR: 64 (07-08-23 @ 09:09) (64 - 64)  BP: 151/47 (07-08-23 @ 09:09) (151/47 - 151/47)  RR: 17 (07-08-23 @ 09:09) (17 - 17)  SpO2: 96% (07-08-23 @ 09:09) (96% - 96%)  Wt(kg): --    PE   R LE:  AOx1  Skin intact; No ecchymosis/soft tissue swelling  Compartments soft; - TTP about hip. No TTP to knee/leg/ankle/foot   able to SLR without pain, no pain with axial loading, no pain with log rolling  Motor intact GS/TA/FHL/EHL  SILT L2-S1  DP/PT pulses 2+    SECONDARY EXAM: Benign, Skin intact, SILT throughout, motor grossly intact throughout, no other orthopedic injuries at this time, compartments soft and compressible    SPINE: Skin intact, no bony tenderness or step-offs appreciated throughout cervical/thoracic/lumbar/sacral spine    BL UE: Skin intact, no erythema, ecchymosis, edema, gross deformity, NTTP over the bony prominences of the shoulder/elbow/wrist/hand, painless passive/active ROM of the shoulder/elbow/wrist/hand, C5-T1 SILT, motor grossly intact throughout axillary/musculocutaenous/radial/median/ulnar nerves, + radial pulse    L LE: Skin intact, no erythema, ecchymosis, edema, gross deformity, NTTP over the bony prominences of the hip/knee/ankle/foot, painless passive/active ROM of the hip/knee/ankle/foot, L2-S1 SILT, motor grossly intact throughout hip flexors/quads/hams/TA/EHL/FHL/GSC, + DP/PT pulses, no pain with log roll, no pain on axial loading, compartments soft and compressible, calves nontender      Imaging:  XR/CT no acute frx, coxa profunda severe OA    89y Male with right hip OA  - Pain control  - no concern for hip fracture   - PT/OT  - WBAT  - DVT ppx as needed  - No acute orthopaedic surgical intervention at this time.  - Outpatient follow up with attending on call, Dr. Garrett in 1-2 weeks following discharge. Please call office for an appointment.  - Ortho stable for discharge. Ortho to sign off. Please call with questions and reconsult as needed.    Andrea Neville, DO   PGY-5 Orthopaedic Surgery

## 2023-07-08 NOTE — ED PROVIDER NOTE - PATIENT PORTAL LINK FT
You can access the FollowMyHealth Patient Portal offered by [facility] by registering at the following website: [email or web address]. By joining [facility]’s FollowMyHealth portal, you will also be able to view your health information using other applications (apps) compatible with our system.

## 2023-07-08 NOTE — CONSULT NOTE ADULT - ATTENDING COMMENTS
Orthopedic Sports Attending:  Agree with above resident/PA note.  Note edited where necessary.  Images reviewed and patient examined. Xrays/CT demonstrate severe OA right hip. No  fracture appreciated. May FU outpatient as needed    Nic Harris, DO  Orthopaedic Surgery

## 2023-07-08 NOTE — ED ADULT NURSE NOTE - OBJECTIVE STATEMENT
Pt presents to ER s/p unwitnessed fall out of bed this morning. Pt daughter heard him fall responded immediately. Pt hit the right side of his head on dressing, hematoma on right forehead; small laceration noted with small amount of blood. No blood thinners. Pt is on stretcher in hallway now, pt understands english but is native [place] speaker. Pt is confused at baseline. Dtr at bedside. Pt c/o pain to the right leg. No c/o SOB, dizziness, or CP.

## 2023-07-08 NOTE — ED PROVIDER NOTE - CLINICAL SUMMARY MEDICAL DECISION MAKING FREE TEXT BOX
Patient with fall history of dementia unclear if mechanical will CT rule out bleed, patient noticed noted to have raspy cough will x-ray rule out pneumonia, will check UA likely discharge.

## 2023-07-25 PROBLEM — N40.0 BPH (BENIGN PROSTATIC HYPERPLASIA): Status: ACTIVE | Noted: 2023-01-01

## 2023-07-25 PROBLEM — Z71.89 ADVANCE CARE PLANNING: Status: ACTIVE | Noted: 2023-01-01

## 2023-07-28 PROBLEM — F01.50 VASCULAR DEMENTIA WITHOUT BEHAVIORAL DISTURBANCE: Status: ACTIVE | Noted: 2021-01-28

## 2023-07-28 PROBLEM — I70.0 AORTIC ATHEROSCLEROSIS: Status: ACTIVE | Noted: 2023-01-01

## 2023-07-28 PROBLEM — N18.4 CKD (CHRONIC KIDNEY DISEASE), STAGE IV: Status: ACTIVE | Noted: 2023-01-01

## 2023-07-28 PROBLEM — F33.9 DEPRESSION, RECURRENT: Status: ACTIVE | Noted: 2023-01-01

## 2023-07-28 PROBLEM — E44.1 MALNUTRITION OF MILD DEGREE: Status: ACTIVE | Noted: 2023-01-01

## 2023-07-28 NOTE — REASON FOR VISIT
[Initial Eval - Existing Diagnosis] : an initial evaluation of an existing diagnosis [Family Member] : family member [Pre-Visit Preparation] : pre-visit preparation was done [Intercurrent Specialty/Sub-specialty Visits] : the patient has intercurrent specialty/sub-specialty visits [Spouse] : spouse

## 2023-07-28 NOTE — REVIEW OF SYSTEMS
[Fatigue] : fatigue [Recent Change In Weight] : ~T recent weight change [Vision Problems] : vision problems [Hearing Loss] : hearing loss [Incontinence] : incontinence [Dizziness] : dizziness [Unsteady Walk] : ataxia [Anxiety] : anxiety [Negative] : Respiratory

## 2023-07-28 NOTE — COUNSELING
[Underweight - ( BMI  <18.5 )] : underweight - ( BMI  <18.5 ) [Mediterranean diet recommended] : Mediterranean diet recommended [TLC diet recommended] : TLC diet recommended [Hypertension self management education material provided] : hypertension self management education material provided [Non - Smoker] : non-smoker [Smoke/CO Detectors] : smoke/CO detectors [Use grab bars] : use grab bars [Use assistive device to avoid falls] : use assistive device to avoid falls [Remove clutter and unsafe carpeting to avoid falls] : remove clutter and unsafe carpeting to avoid falls [] : foot exam [Improve exercise tolerance] : improve exercise tolerance [Decrease hospital use] : decrease hospital use [Decrease stress] : decrease stress [Minimize unnecessary interventions] : minimize unnecessary interventions [Discussed disease trajectory with patient/caregiver] : discussed disease trajectory with patient/caregiver [Likely to achieve goals/desired outcomes] : likely to achieve goals/desired outcomes [ - New patient with 2 or more chronic conditions; CCM discussed and patient-centered care plan established] : New patient with 2 or more chronic conditions; CCM discussed and patient-centered care plan established

## 2023-07-28 NOTE — DATA REVIEWED
[FreeTextEntry1] : all t blood work  urine culture  and radiology reviewed \par discussed with patient /caretakers   at length and in detail \par \par

## 2023-07-28 NOTE — HISTORY OF PRESENT ILLNESS
[Family Member] : family member [Patient] : patient [Spouse] : spouse [FreeTextEntry1] : weakness gait instability  [FreeTextEntry2] : patient is seen today for initial visit and enrollment into  a house call  program along with care manager  therea \par patient is homebound due  to  weakness  and end stage renal disease \par  most of the history obtained from family member  wife \par Past medical history include    ESRD  weakness \par  patient  still follows with   specialists  nephrologist  \par last hospitalization 3/2023\par diet regular  appetite   poor \par dysphagia  none \par Weight  declining \par constipation  no  but gets loose \par incontinence yes \par pressure/bed sores denies \par Ambulates with rolling walker \par falls yes gets PT  twice a week \par Behavior ok  quiet  gets agitated  \par Mood ok  \par  memory  poor \par  sleep    ok great \par sensory deficits  wears glasses  does not wear hearing aid\par pain denies  \par Medication refills done and reconciliation  done \par Goals of care discussed and completed \par  Review of systems otherwise negative. See also updated history sheet.

## 2023-07-28 NOTE — CHRONIC CARE ASSESSMENT
[Patient wanders] : patient wanders [< 30 Minutes/Session] : (< 30 minutes per session) [Resistant to using DME in place] : resistant to using DME in place [Strength Training] : strength training [Low Salt Diet] : low salt [General Adherence] : and is generally adherent [PPS Score: ____] : Palliative Performance Scale (PPS) Score: [unfilled] [FAST Score: ____] : Functional Assessment Scale (FAST) Score: [unfilled]

## 2023-07-28 NOTE — PHYSICAL EXAM
[No Acute Distress] : no acute distress [Normal Voice/Communication] : normal voice communication [Normal Sclera/Conjunctiva] : normal sclera/conjunctiva [No JVD] : no jugular venous distention [Supple] : the neck was supple [No Respiratory Distress] : no respiratory distress [Normal Rate] : heart rate was normal  [Regular Rhythm] : with a regular rhythm [Non Tender] : non-tender [No Masses] : no abdominal mass palpated [Patient Refused] : rectal exam was refused by the patient [No Rash] : no rash [No Motor Deficits] : the motor exam was normal [Normal Outer Ear/Nose] : the ears and nose were normal in appearance [Oriented x3] : oriented to person, place, and time [de-identified] :  quiet  flat affect   thin male cooperates  [de-identified] :  decreased at bases  [de-identified] :  1 + edema   [de-identified] : antalgic  unsteady  decreased  strength and tone  [de-identified] :  elongated brittle nails  [de-identified] : flat affect

## 2023-08-17 PROBLEM — M62.838 MUSCLE SPASM: Status: ACTIVE | Noted: 2023-01-01

## 2023-08-19 NOTE — ED PROVIDER NOTE - CONSTITUTIONAL, MLM
Elderly  male, awake, acute upon chronically ill, +mild respiratory distress normal... Elderly  male, awake, acute upon chronically ill, +mild respiratory distress, some shortening of sentences

## 2023-08-19 NOTE — PHARMACOTHERAPY INTERVENTION NOTE - COMMENTS
Medication history complete. Medications and allergies reviewed with patient's daughter, Day [phone] and confirmed with .

## 2023-08-19 NOTE — H&P ADULT - HISTORY OF PRESENT ILLNESS
89 y/o male w/ a PMHx of HTN, CKD IV, prostate CA, dementia, anxiety, HLD, arrythmia, and right inguinal hernia s/p repair presents to the ED BIB family for generalized weakness and mild SOB. Per EMS, pt was found to be hypoxic to 80s on RA, placed on 6L NC w/ improvement. Per the family patient has been unable to sleep the last several days and sustained a fall a bed but was caught by HHA.     In the ED patient had black tarry BM with positive stool Guaiac with Hb 7.0. Troponin also elevated at 472 and BNP 30k.

## 2023-08-19 NOTE — H&P ADULT - ASSESSMENT
89 y/o male w/ a PMHx of HTN, CKD IV, prostate CA, dementia, anxiety, HLD, arrythmia, and right inguinal hernia s/p repair presents to the ED BIB being admitted for:    #AHRF 2/2 to PNA vs CHF exacerbation  - patient desaturated to 80s at home; now on 94% on 5L   - BNP elevated 30K; s/p IV 60mg Lasix   - CXR shows LLL infiltrate concerning for PNA  - s/p IV Zithromax and IV Rocephin; will continue     #Acute anemia 2/2 to Melena  - patient had large black tarry BM in ED  - Hb 7.0; received 1 U pRBC; repeat H/H to be collected  - start IV Protonix 40mg BID  - GI referral requested  - will avoid crystalloid infusion at this time given AHRF likely 2/2 to CHF exacerbation     #Troponemia   - Trop 472; repeat pending  - unlikely to be ACS; troponin elevation from CKD and increased metabolic demand   - no CP, no palpitations  - will not AC at this time  - EKG shows    #CKD  - Cr 5.71   - hx of CKD IV  - renal diet; avoid nephrotoxic medications     #Code Status  - Goals of Care further discussed with daughter (+ HCP): + DNR/DNI, NIV acceptable as short-term measure, IVF & IV Abx allowable as medically indicated, PRBC transfusion allowable as medically indicated.  NO vasopressors, no feeding tube.  New MOLST form completed    #VTE PPx  - will hold AC at this time 2/2 to acute GI bleed    #Dispo  - Guarded diagnosis    89 y/o male w/ a PMHx of HTN, CKD IV, prostate CA, dementia, anxiety, HLD, arrythmia, and right inguinal hernia s/p repair presents to the ED BIB being admitted for:    #AHRF 2/2 to PNA vs CHF exacerbation  - patient desaturated to 80s at home; now on 92-93% on 6L   - BNP elevated 30K; s/p IV 60mg Lasix   - CXR shows LLL infiltrate concerning for PNA  - decreased breath sounds on bibasilar lung fields, no wet rales auscultated, does not appear clinically hypervolemic   - s/p IV Zithromax and IV Rocephin; will continue     #Acute anemia 2/2 to Melena  - patient had large black tarry BM in ED  - Hb 7.0; received 1 U pRBC; repeat H/H to be collected  - start IV Protonix 40mg BID  - GI referral requested  - will avoid crystalloid infusion at this time given AHRF likely 2/2 to CHF exacerbation     #Troponemia   - Trop 472; repeat pending  - unlikely to be ACS; troponin elevation from CKD and increased metabolic demand   - no CP, no palpitations  - will not AC at this time  - EKG shows HR 72 with some PACs. No ST segment changes/TWI (personally reviewed)    #CKD  - Cr 5.71   - hx of CKD IV  - renal diet; avoid nephrotoxic medications     #Code Status  - Goals of Care further discussed with daughter (+ HCP): + DNR/DNI, NIV acceptable as short-term measure, IVF & IV Abx allowable as medically indicated, PRBC transfusion allowable as medically indicated.  NO vasopressors, no feeding tube.  New MOLST form completed    #VTE PPx  - will hold AC at this time 2/2 to acute GI bleed    #Dispo  - Guarded diagnosis    89 y/o male w/ a PMHx of HTN, CKD IV, prostate CA, dementia, anxiety, HLD, arrythmia, and right inguinal hernia s/p repair presents to the ED BIB being admitted for:    #AHRF 2/2 to PNA vs CHF exacerbation  - patient desaturated to 80s at home; now on 92-93% on 6L   - BNP elevated 30K; s/p IV 60mg Lasix   - CXR shows LLL infiltrate concerning for PNA  - decreased breath sounds on bibasilar lung fields, no wet rales auscultated, does not appear clinically hypervolemic   - s/p IV Zithromax and IV Rocephin; will continue     #Acute anemia 2/2 to Melena  - patient had large black tarry BM in ED  - Hb 7.0; received 1 U pRBC; repeat H/H to be collected  - start IV Protonix 40mg BID  - Patient is on home Amlodipine but will hold off on CCB Tx as will decrease cardiac output; consider restarting on dc   - GI referral requested  - will avoid crystalloid infusion at this time given AHRF likely 2/2 to CHF exacerbation     #Troponemia   - Trop 472; repeat pending  - unlikely to be ACS; troponin elevation from CKD and increased metabolic demand   - no CP, no palpitations  - will not AC at this time  - Cardiology consult with Dr. Quiñonez requested   - EKG shows HR 72 with some PACs. No ST segment changes/TWI (personally reviewed)    #CKD  - Cr 5.71   - hx of CKD IV  - renal diet; avoid nephrotoxic medications     #Code Status  - Goals of Care further discussed with daughter (+ HCP): + DNR/DNI, NIV acceptable as short-term measure, IVF & IV Abx allowable as medically indicated, PRBC transfusion allowable as medically indicated.  NO vasopressors, no feeding tube.  New MOLST form completed    #VTE PPx  - will hold AC at this time 2/2 to acute GI bleed    #Dispo  - Guarded diagnosis    89 y/o male w/ a PMHx of HTN, CKD IV, prostate CA, dementia, anxiety, HLD, arrythmia, and right inguinal hernia s/p repair presents to the ED BIB being admitted for:    #AHRF 2/2 to PNA vs CHF exacerbation  - patient desaturated to 80s at home; now on 92-93% on 6L   - BNP elevated 30K; s/p IV 60mg Lasix   - CXR shows LLL infiltrate concerning for PNA  - decreased breath sounds on bibasilar lung fields, no wet rales auscultated, does not appear clinically hypervolemic   - s/p IV Zithromax and IV Rocephin; will continue     #Acute anemia 2/2 to Melena  - patient had large black tarry BM in ED  - Hb 7.0; received 1 U pRBC; repeat H/H to be collected  - start IV Protonix 40mg BID  - Patient is on home Amlodipine but will hold off on CCB Tx as will decrease cardiac output; consider restarting on dc   - GI referral requested  - will avoid crystalloid infusion at this time given AHRF likely 2/2 to CHF exacerbation     #Troponemia   - Trop 472 ->2000  - acute rise in troponin less likely to be demand ischemia and now appears to be NSTEMI  - given acute GI bleed will not AC at this time as risks outweigh the benefits  - Cardiology consult with Dr. Quiñonez; spoke with him and agreeable to hold AC at this time  - EKG shows HR 72 with some PACs. No ST segment changes/TWI (personally reviewed)    #CKD  - Cr 5.71   - hx of CKD IV  - renal diet; avoid nephrotoxic medications     #Code Status  - Goals of Care further discussed with daughter (+ HCP): + DNR/DNI, NIV acceptable as short-term measure, IVF & IV Abx allowable as medically indicated, PRBC transfusion allowable as medically indicated.  NO vasopressors, no feeding tube.  New MOLST form completed    #VTE PPx  - will hold AC at this time 2/2 to acute GI bleed    #Dispo  - Guarded diagnosis at this time and family is aware and accept there is high risk for acute decompensation

## 2023-08-19 NOTE — ED PROVIDER NOTE - OBJECTIVE STATEMENT
89 y/o male w/ a PMHx of HTN, CKD IV, prostate CA, dementia, anxiety, HLD, arrythmia, and right inguinal hernia s/p repair presents to the ED BIB family for generalized weakness and mild SOB. Per EMS, pt was found to be hypoxic to 80s on RA, placed on 6L NC w/ improvement. Pt denies falls, syncope, fevers, cough, or CP. Pt had large black tarry BM shortly after arrival to main ED. No other complaints at this time. Pt poor historian d/t dementia and current illness.

## 2023-08-19 NOTE — CONSULT NOTE ADULT - SUBJECTIVE AND OBJECTIVE BOX
NEPHROLOGY CONSULT  HPI:  89 y/o male w/ a PMHx of HTN, CKD IV, prostate CA, dementia, anxiety, HLD, arrythmia, and right inguinal hernia s/p repair presents to the ED BIB family for generalized weakness and mild SOB. Per EMS, pt was found to be hypoxic to 80s on RA, placed on 6L NC w/ improvement. Per the family patient has been unable to sleep the last several days and sustained a fall a bed but was caught by HHA.     In the ED patient had black tarry BM with positive stool Guaiac with Hb 7.0. Troponin also elevated at 472 and BNP 30k.  (19 Aug 2023 19:18)    Nephrology:  Above from chart as pt unable to give any information  Pt seen in ED getting transfusion of PRBC,   Got lasix 60 mg iv x 1  Patient non communicating  Pt w h/o ckd decided by family no hd due to advanced age and dementia    PAST MEDICAL & SURGICAL HISTORY:  Arrhythmia      Pure hypercholesterolemia      Anxiety disorder      Dementia      Prostate cancer      Hypertension  was taken off medication by Dr Fischer 2016      History of loop recorder  LINQ hear monitor placed      H/O left knee surgery      H/O right inguinal hernia repair          FAMILY HISTORY:  Family history of hypertension        MEDICATIONS  (STANDING):  azithromycin  IVPB 500 milliGRAM(s) IV Intermittent every 24 hours  cefTRIAXone Injectable. 1000 milliGRAM(s) IV Push every 24 hours  pantoprazole  Injectable 40 milliGRAM(s) IV Push two times a day    MEDICATIONS  (PRN):  acetaminophen     Tablet .. 650 milliGRAM(s) Oral every 6 hours PRN Temp greater or equal to 38C (100.4F), Mild Pain (1 - 3)  aluminum hydroxide/magnesium hydroxide/simethicone Suspension 30 milliLiter(s) Oral every 4 hours PRN Dyspepsia  melatonin 3 milliGRAM(s) Oral at bedtime PRN Insomnia  ondansetron Injectable 4 milliGRAM(s) IV Push every 8 hours PRN Nausea and/or Vomiting      Allergies    No Known Allergies    Intolerances        I&O's Summary        REVIEW OF SYSTEMS:          Vital Signs Last 24 Hrs  T(C): 36.6 (19 Aug 2023 18:00), Max: 36.7 (19 Aug 2023 12:57)  T(F): 97.8 (19 Aug 2023 18:00), Max: 98.1 (19 Aug 2023 12:57)  HR: 68 (19 Aug 2023 18:00) (65 - 73)  BP: 124/61 (19 Aug 2023 18:00) (123/62 - 149/58)  BP(mean): 84 (19 Aug 2023 15:35) (84 - 84)  RR: 22 (19 Aug 2023 18:27) (20 - 22)  SpO2: 94% (19 Aug 2023 18:27) (89% - 98%)    Parameters below as of 19 Aug 2023 18:27  Patient On (Oxygen Delivery Method): nasal cannula  O2 Flow (L/min): 6      Daily Height in cm: 170.18 (19 Aug 2023 12:57)    Daily     I&O's Summary      PHYSICAL EXAM:    General:  non-communicating, moaning in discomfort when examined/ moved    Neuro:      HEENT:      Cardiovascular:  Regular rate and rhythm, with normal S1 and S2.    Lungs:  bilateral rhonchi    Abdomen:  non tender, soft  difficult to examine pt resists  bladder scan 172 ML    Skin:  Warm, dry    Extremities:  warm,  no cyanosis    LABS:                        7.0    5.59  )-----------( 162      ( 19 Aug 2023 13:18 )             21.3     08-19    140  |  109<H>  |  68<H>  ----------------------------<  111<H>  4.9   |  22  |  5.71<H>    Ca    8.5      19 Aug 2023 13:18    TPro  6.6  /  Alb  3.2<L>  /  TBili  0.6  /  DBili  x   /  AST  31  /  ALT  17  /  AlkPhos  59  08-19    PT/INR - ( 19 Aug 2023 13:18 )   PT: 11.6 sec;   INR: 1.03 ratio         PTT - ( 19 Aug 2023 13:18 )  PTT:26.2 sec  Urinalysis Basic - ( 19 Aug 2023 13:18 )    Color: x / Appearance: x / SG: x / pH: x  Gluc: 111 mg/dL / Ketone: x  / Bili: x / Urobili: x   Blood: x / Protein: x / Nitrite: x   Leuk Esterase: x / RBC: x / WBC x   Sq Epi: x / Non Sq Epi: x / Bacteria: x

## 2023-08-19 NOTE — PATIENT PROFILE ADULT - FUNCTIONAL ASSESSMENT - BASIC MOBILITY 6.
2-calculated by average/Not able to assess (calculate score using [facility] averaging method)

## 2023-08-19 NOTE — ED PROVIDER NOTE - CARDIAC, MLM
Normal rate, regular rhythm.  Heart sounds S1, S2.  No murmurs, rubs or gallops. Normal rate, regular rhythm.  Heart sounds S1, S2.  No murmurs, rubs or gallops.  Normal radial pulse

## 2023-08-19 NOTE — H&P ADULT - NSHPPHYSICALEXAM_GEN_ALL_CORE
Vital Signs Last 24 Hrs  T(C): 36.6 (19 Aug 2023 18:00), Max: 36.7 (19 Aug 2023 12:57)  T(F): 97.8 (19 Aug 2023 18:00), Max: 98.1 (19 Aug 2023 12:57)  HR: 68 (19 Aug 2023 18:00) (65 - 73)  BP: 124/61 (19 Aug 2023 18:00) (123/62 - 149/58)  BP(mean): 84 (19 Aug 2023 15:35) (84 - 84)  RR: 22 (19 Aug 2023 18:27) (20 - 22)  SpO2: 94% (19 Aug 2023 18:27) (89% - 98%)    Parameters below as of 19 Aug 2023 18:27  Patient On (Oxygen Delivery Method): nasal cannula  O2 Flow (L/min): 6    CONSTITUTIONAL: Elderly  male, mildly responsive to painful stimuli. acute upon chronically ill, +mild respiratory distress  ENMT: Airway patent, Nasal mucosa clear. Mouth with mildly dry mucous membranes. Throat has no vesicles, no oropharyngeal exudates and uvula is midline.  EYES: Clear bilaterally, pupils equal, round and reactive to light.  CARDIAC: Normal rate, regular rhythm.  Heart sounds S1, S2.  No murmurs, rubs or gallops.  RESPIRATORY: +mild respiratory distress, occasional dry cough, mild dry, decreased breath sounds b/l, no obvious rales, rhonchi or wheezing. +mouth breathing   GASTROINTESTINAL: Abdomen soft, non-tender, no guarding. BS hypoactive normal pitch,  MUSCULOSKELETAL: Spine appears normal, range of motion is not limited, no muscle or joint tenderness. GUY x4. No focal swelling or tenderness.  NEUROLOGICAL: Alert and oriented, no focal deficits, no motor or sensory deficits. poor historian, some shortening of sentences, no gross extremity weakness, appears somewhat confused.  SKIN: No evidence of rash

## 2023-08-19 NOTE — ED PROVIDER NOTE - ENMT, MLM
Airway patent, Nasal mucosa clear. Mouth with mildly dry mucous membranes. Throat has no vesicles, no oropharyngeal exudates and uvula is midline.

## 2023-08-19 NOTE — ED ADULT NURSE NOTE - CCCP TRG CHIEF CMPLNT
Dr. Fulton: [facility].  She cannot take a verbal order over the phone for Melatonin, the way it is ordered 2-4mg at night. Dose needs to be exact (either 2m g or 4mg). RN said you can escribe this order to [facility] (I entered this in patient chart). Please send order to [facility] pharmacy.   weakness

## 2023-08-19 NOTE — ED PROVIDER NOTE - NEUROLOGICAL, MLM
Alert and oriented, no focal deficits, no motor or sensory deficits. poor historian, some shortening of sentences, no gross extremity weakness, appears somewhat confused. Alert, no focal deficits, no motor or sensory deficits. poor historian, some shortening of sentences, no gross extremity weakness, appears somewhat confused.

## 2023-08-19 NOTE — ED PROVIDER NOTE - CARE PLAN
1 Principal Discharge DX:	Community acquired pneumonia of left lung  Secondary Diagnosis:	Anemia  Secondary Diagnosis:	Stool guaiac positive  Secondary Diagnosis:	Chronic kidney disease, unspecified CKD stage  Secondary Diagnosis:	Acute respiratory failure with hypoxia  Secondary Diagnosis:	Troponin I above reference range  Secondary Diagnosis:	Cardiac volume overload

## 2023-08-19 NOTE — CONSULT NOTE ADULT - ASSESSMENT
89 y/o male w/ a PMHx of HTN, CKD IV, prostate CA, dementia, anxiety, HLD, arrythmia, and right inguinal hernia s/p repair presents to the ED BIB family for generalized weakness and mild SOB. Per EMS, pt was found to be hypoxic to 80s on RA, placed on 6L NC w/ improvement. Per the family patient has been unable to sleep the last several days and sustained a fall a bed but was caught by HHA.     In the ED patient had black tarry BM with positive stool Guaiac with Hb 7.0. Troponin also elevated at 472 and BNP 30k.  (19 Aug 2023 19:18)    Nephrology:  Above from chart as pt unable to give any information  Pt seen in ED getting transfusion of PRBC,   Got lasix 60 mg iv x 1  Patient non communicating  Pt w h/o ckd decided by family no hd due to advanced age and dementia  will follow  Bladder scan bedside 172 ml, done by myself

## 2023-08-19 NOTE — ED PROVIDER NOTE - CONVERSATION DETAILS
Goals of Care further discussed with daughter (+ HCP): + DNR/DNI, NIV acceptable as short-term measure, IVF & IV Abx allowable as medically indicated, PRBC transfusion allowable as medically indicated.  NO vasopressors, no feeding tube.  New MOLST form completed.

## 2023-08-19 NOTE — ED CLERICAL - NS ED CLERK NOTE PRE-ARRIVAL INFORMATION; ADDITIONAL PRE-ARRIVAL INFORMATION

## 2023-08-19 NOTE — ED ADULT TRIAGE NOTE - CHIEF COMPLAINT QUOTE
Pt presents to ED from home with family/ c/o generalized weakness, unable to get off toilet. noted to be hypoxic by EMS to 80s RA and placed on 6L NC with improvement. denies LOC, falls. hx renal disease.

## 2023-08-19 NOTE — ED ADULT NURSE REASSESSMENT NOTE - NS ED NURSE REASSESS COMMENT FT1
pt covered in black/green tarry stool. sample saved for MD for GUIAC which is lightly positive. Alanis care provided

## 2023-08-19 NOTE — PATIENT PROFILE ADULT - FALL HARM RISK - HARM RISK INTERVENTIONS

## 2023-08-19 NOTE — ED PROVIDER NOTE - SECONDARY DIAGNOSIS.
Stool guaiac positive Chronic kidney disease, unspecified CKD stage Acute respiratory failure with hypoxia Anemia Cardiac volume overload Troponin I above reference range

## 2023-08-19 NOTE — H&P ADULT - ATTENDING COMMENTS
89 year old male PMHx as noted above admitted for further evaluation and management of progressively worsening shortness of breath and melena.    #AHRF 2/2 to PNA vs CHF exacerbation  - patient desaturated to 80s at home; now on 92-93% on 6L   - BNP elevated 30K; s/p IV 60mg Lasix   - CXR shows LLL infiltrate concerning for PNA  - decreased breath sounds on bibasilar lung fields, no wet rales auscultated, does not appear clinically hypervolemic   - s/p IV Zithromax and IV Rocephin; will continue     #Acute anemia 2/2 to Melena  - patient had large black tarry BM in ED  - Hb 7.0; received 1 U pRBC; repeat H/H to be collected  - start IV Protonix 40mg BID  - Patient is on home Amlodipine but will hold off on CCB Tx as will decrease cardiac output; consider restarting on dc   - GI referral requested  - will avoid crystalloid infusion at this time given AHRF likely 2/2 to CHF exacerbation     #Troponemia   - Trop 472 ->2000  - acute rise in troponin less likely to be demand ischemia and now appears to be NSTEMI  - given acute GI bleed will not AC at this time as risks outweigh the benefits  - Cardiology consult with Dr. Quiñonez; spoke with him and agreeable to hold AC at this time  - EKG shows HR 72 with some PACs. No ST segment changes/TWI (personally reviewed)    #CKD  - Cr 5.71   - hx of CKD IV  - renal diet; avoid nephrotoxic medications     #Code Status  - Goals of Care further discussed with daughter (+ HCP): + DNR/DNI, NIV acceptable as short-term measure, IVF & IV Abx allowable as medically indicated, PRBC transfusion allowable as medically indicated.  NO vasopressors, no feeding tube.  New MOLST form completed    #VTE PPx  - will hold AC at this time 2/2 to acute GI bleed    #Dispo  - Guarded diagnosis at this time and family is aware and accept there is high risk for acute decompensation 89 year old male PMHx as noted above admitted for further evaluation and management of progressively worsening shortness of breath and melena.    #Acute Hypoxic Respiratory Failure due to Pneumonia complicated by Acute CHF exacerbation and NSTEMI  -admit to Telemetry  -f/u PAN C+S  -cont. IV parenteral antibiotics as outlined above   -daily weights  -strict I/Os  -cont. supplemental oxygen  -cont. to trend Kay  -f/u w/ Cardiology in the am  -f/u TTE    #Acute anemia due to GI Bleeding  -serial CBCs  -Type and Screen  -patient received 1 U pRBC  -cont. IV PPI as above   -f/u w. GI     #CKD  -f/u w/ Nephrology   -cont. management as outlined above     #VTe PPx  -as above hold AC at this time 2/2 to acute GI bleed

## 2023-08-19 NOTE — ED PROVIDER NOTE - FAMILY DETAILS FREE TEXT FOR MDM ADDL HISTORY OBTAINED FROM QUESTION
14:50, YUKI Crane MD:  Daughter at bedside, reports 2 nights ago pt awake through the night, talking to himself.  Subsequent extra tired, sleeping next day but engaged with her in regular conversation.  Today HHA informed her that pt was too weak to get up out of bed on his own & was too unsteady walking, + fell but HHA caught him, no trauma.  Pt appeared lethargic, + SOB with some audible wheezing, so called 911.  Pt on oral Fe for chronic Fe-def. anemia.  PCP: Lamonte   Cardio: Aaliyah     Renal: Jasper

## 2023-08-19 NOTE — ED PROVIDER NOTE - RESPIRATORY, MLM
+mild respiratory distress, occasional dry cough, mild dry, decreased breath sounds b/l, no obvious rales, rhonchi or wheezing.

## 2023-08-19 NOTE — ED PROVIDER NOTE - MUSCULOSKELETAL, MLM
Spine appears normal, range of motion is not limited, no muscle or joint tenderness. GUY x4. No focal swelling or tenderness. Spine appears normal, range of motion is not limited, no muscle or joint tenderness. GUY x4. No focal swelling nor tenderness.

## 2023-08-19 NOTE — ED PROVIDER NOTE - CLINICAL SUMMARY MEDICAL DECISION MAKING FREE TEXT BOX
89 y/o male w/ multiple PMH including HTN, HLD, dementia, CKD, prostate CA BIBA from home regarding worsening generalized weakness +hypoxic, acutely ill appearing. Pt had large black tarry stool upon ED arrival. Clinical concern for GI bleed, PNA, COVID, UTI, and cardiac pathology. Plan: O2 NC, labs including PAN culture, coags, troponin, BNP, RVP/COVID swab, urine w/ culture, type + screen., monitor, observe, and reassess. Expect admission.     Addendum 14:15: Guaiac test. Lot 241. Exp 4/30/2024. Guaiac positive. QC passed. Dark tarry stool. 89 y/o male w/ multiple PMH including HTN, HLD, dementia, CKD, prostate CA BIBA from home regarding worsening generalized weakness +hypoxic, acutely ill appearing. Pt had large black tarry stool upon ED arrival. Clinical concern for GI bleed, PNA, COVID, UTI, and cardiac pathology. Plan: O2 NC, labs including PAN culture, coags, troponin, BNP, RVP/COVID swab, urine w/ culture, type + screen., monitor, observe, and reassess. Expect admission.     Addendum 14:15: Guaiac test. Lot 241. Exp 4/30/2024. Guaiac mildly positive. QC passed. Dark tarry stool. 89 y/o male w/ multiple PMH including HTN, HLD, dementia, CKD, prostate CA BIBA from home regarding worsening generalized weakness +hypoxic, acutely ill appearing. Pt had large black tarry stool upon ED arrival. Clinical concern for GI bleed, PNA, COVID, UTI, and cardiac pathology. Plan: O2 NC, labs including PAN culture, coags, troponin, BNP, RVP/COVID swab, urine w/ culture, type + screen., monitor, observe, and reassess. Expect admission.     Addendum 14:15: Guaiac test. Lot 241. Exp 4/30/2024. Guaiac mildly positive. QC passed. Dark tarry stool.    16:45, YUKI Crane MD:  CXR wet read ? CHF, though moreso L perihilar infilt.  labs notable for low Hgb 7, less than 8.3 noted 6 weeks ago; high BNP of 30K with Troponin elevated at 472.  Cr 5.7, ~ baseline.  WBC & lactate normal, COVID neg.  1 unit PRBC, Lasix 60 mg IVP & IV Ceftriaxone/Zithromax ordered, pt on NC O2.  Dr. Meraz aware of Tele admission.  MOLST form revised (see Goals of Care). 89 y/o male w/ multiple PMH including HTN, HLD, dementia, CKD, prostate CA BIBA from home regarding worsening generalized weakness +hypoxic, acutely ill appearing. Pt had large black tarry stool upon ED arrival. Clinical concern includes GI bleed, PNA, COVID, UTI, and cardiac pathology. Plan: O2 NC, labs including PAN culture, coags, troponin, BNP, RVP/COVID swab, urine w/ culture, type + screen., monitor, observe, and reassess. Expect admission.     Addendum 14:15: Guaiac test. Lot 241. Exp 4/30/2024. Guaiac mildly positive. QC passed. Dark tarry stool.    16:45, YUKI Crane MD:  CXR wet read ? CHF, though moreso L perihilar infilt.  labs notable for low Hgb 7, less than 8.3 noted 6 weeks ago; high BNP of 30K with Troponin elevated at 472.  Cr 5.7, ~ baseline.  WBC & lactate normal, COVID neg.  1 unit PRBC, Lasix 60 mg IVP & IV Ceftriaxone/Zithromax ordered, pt on NC O2.  ASA contra-indicated given suspected GI Bleed & advanced CKD.  Dr. Meraz aware of Tele admission.  MOLST form revised (see Goals of Care).

## 2023-08-19 NOTE — PATIENT PROFILE ADULT - FUNCTIONAL ASSESSMENT - DAILY ACTIVITY 4.
Vital Signs: I have reviewed the initial vital signs.  Constitutional: well-nourished, appears stated age, no acute distress  HEENT: NCAT, moist mucous membranes, normal TMs, PERRLA, +b/l horizontal nystagmus  Cardiovascular: regular rate, regular rhythm, well-perfused extremities  Respiratory: unlabored respiratory effort, clear to auscultation bilaterally  Gastrointestinal: soft, non-tender abdomen, no palpable organomegaly  Musculoskeletal: supple neck, no gross deformities  Integumentary: warm, dry, no rash  Neurologic: awake, alert, normal tone, moving all extremities, CN 2-12 intact, strength and sensation 5/5 upper and lower extremities b/l
2 = A lot of assistance

## 2023-08-20 NOTE — CHART NOTE - NSCHARTNOTEFT_GEN_A_CORE
Alerted by RN that patient complaining of sharp abdominal pain. At bedside patient appeared to be in respiratory distress with labored breathing. Likely 2/2 to fluid overload from RBC infusion. Given STAT IV Lasix 40mg. Called daughter and discussed poor prognosis of which she is aware. DNR/DNI confirmed. AM Troponin and H/h pending.     Vital Signs Last 24 Hrs  T(C): 37 (20 Aug 2023 06:20), Max: 37.4 (20 Aug 2023 03:30)  T(F): 98.6 (20 Aug 2023 06:20), Max: 99.4 (20 Aug 2023 03:30)  HR: 68 (20 Aug 2023 06:20) (65 - 77)  BP: 141/70 (20 Aug 2023 06:20) (123/62 - 153/47)  BP(mean): 89 (19 Aug 2023 21:00) (84 - 89)  RR: 20 (20 Aug 2023 06:20) (18 - 22)  SpO2: 95% (20 Aug 2023 06:20) (89% - 98%)    Parameters below as of 20 Aug 2023 06:20  Patient On (Oxygen Delivery Method): nasal cannula  O2 Flow (L/min): 6

## 2023-08-20 NOTE — CONSULT NOTE ADULT - ASSESSMENT
Pt is a 89 y/o male w/ a PMHx of HTN, CKD IV, prostate CA, dementia, anxiety, HLD, arrythmia, and right inguinal hernia s/p repair presents to the ED BIB being admitted for:    PROBLEMS:    Acute hypoxic respiratory failure  AHRF 2/2 to PNA vs CHF exacerbation  CXR shows LLL infiltrate concerning for PNA  Acute anemia 2/2 to Melena  Elevated troponins most likely due demand ischemia due to severe anemia  CKD    PLAN:    fO2 6L NC TITRATE  IV Lasix   IV Zithromax and IV Rocephin; will continue   S/P 1 U pRBC; repeat H/H   IV Protonix 40mg BID  Monitor renal fx  VTE PPx-hold AC at this time 2/2 to acute GI bleed Pt is a 89 y/o male w/ a PMHx of HTN, CKD IV, prostate CA, dementia, anxiety, HLD, arrythmia, and right inguinal hernia s/p repair presents to the ED BIB being admitted for:    PROBLEMS:    Acute hypoxic respiratory failure  AHRF 2/2 to PNA vs CHF exacerbation/asthmatic bronchitis with wheeze  CXR shows LLL infiltrate concerning for PNA  Acute anemia 2/2 to Melena  Elevated troponins most likely due demand ischemia due to severe anemia  CKD    PLAN:    fO2 6L NC TITRATE  IV Lasix   IV Zithromax and IV Rocephin; will continue   IV solumedrols  NEB  S/P 1 U pRBC; repeat H/H   IV Protonix 40mg BID  Monitor renal fx  VTE PPx-hold AC at this time 2/2 to acute GI bleed

## 2023-08-20 NOTE — PROGRESS NOTE ADULT - SUBJECTIVE AND OBJECTIVE BOX
Reason for Admission: Melena, Shortness of breath      Patient is a 89 y/o male w/ a PMHx of HTN, CKD IV, prostate CA, dementia, anxiety, HLD, arrythmia, and right inguinal hernia s/p repair presents to the ED BIB family for generalized weakness and mild SOB. Per EMS, pt was found to be hypoxic to 80s on RA, placed on 6L NC w/ improvement. Per the family patient has been unable to sleep the last several days and sustained a fall a bed but was caught by HHA.     In the ED patient had black tarry BM with positive stool Guaiac with Hb 7.0. Troponin also elevated at 472 and BNP 30k.     REVIEW OF SYSTEMS:  General: NAD, hemodynamically stable, (-)  fever, (-) chills, (-) weakness  HEENT:  Eyes:  No visual loss, blurred vision, double vision or yellow sclerae. Ears, Nose, Throat:  No hearing loss, sneezing, congestion, runny nose or sore throat.  SKIN:  No rash or itching.  CARDIOVASCULAR:  No chest pain, chest pressure or chest discomfort. No palpitations or edema.  RESPIRATORY:  No shortness of breath, cough or sputum.  GASTROINTESTINAL:  No anorexia, nausea, vomiting or diarrhea. No abdominal pain or blood.  NEUROLOGICAL:  No headache, dizziness, syncope, paralysis, ataxia, numbness or tingling in the extremities. No change in bowel or bladder control.  MUSCULOSKELETAL:  No muscle, back pain, joint pain or stiffness.  HEMATOLOGIC:  No anemia, bleeding or bruising.  LYMPHATICS:  No enlarged nodes. No history of splenectomy.  ENDOCRINOLOGIC:  No reports of sweating, cold or heat intolerance. No polyuria or polydipsia.  ALLERGIES:  No history of asthma, hives, eczema or rhinitis.    Physical Exam:   GENERAL APPEARANCE:  NAD, hemodynamically stable  T(C): 37 (08-20-23 @ 06:20), Max: 37.4 (08-20-23 @ 03:30)  HR: 68 (08-20-23 @ 06:20) (65 - 77)  BP: 141/70 (08-20-23 @ 06:20) (123/62 - 153/47)  RR: 20 (08-20-23 @ 06:20) (18 - 22)  SpO2: 95% (08-20-23 @ 06:20) (89% - 98%)  Wt(kg): --  HEENT:  Head is normocephalic    Skin:  Warm and dry without any rash   NECK:  Supple without lymphadenopathy.   HEART:  Regular rate and rhythm. normal S1 and S2, No M/R/G  LUNGS:  Good ins/exp effort, no W/R/R/C  ABDOMEN:  Soft, nontender, nondistended with good bowel sounds heard  EXTREMITIES:  Without cyanosis, clubbing or edema.   NEUROLOGICAL:  Gross nonfocal       CBC Full  -  ( 20 Aug 2023 01:13 )  WBC Count : x  RBC Count : x  Hemoglobin : 7.5 g/dL  Hematocrit : 22.4 %  Platelet Count - Automated : x  Mean Cell Volume : x  Mean Cell Hemoglobin : x  Mean Cell Hemoglobin Concentration : x  Auto Neutrophil # : x  Auto Lymphocyte # : x  Auto Monocyte # : x  Auto Eosinophil # : x  Auto Basophil # : x  Auto Neutrophil % : x  Auto Lymphocyte % : x  Auto Monocyte % : x  Auto Eosinophil % : x  Auto Basophil % : x    PT/INR - ( 19 Aug 2023 13:18 )   PT: 11.6 sec;   INR: 1.03 ratio         PTT - ( 19 Aug 2023 13:18 )  PTT:26.2 sec  Urinalysis Basic - ( 19 Aug 2023 13:18 )    Color: x / Appearance: x / SG: x / pH: x  Gluc: 111 mg/dL / Ketone: x  / Bili: x / Urobili: x   Blood: x / Protein: x / Nitrite: x   Leuk Esterase: x / RBC: x / WBC x   Sq Epi: x / Non Sq Epi: x / Bacteria: x      08-19    140  |  109<H>  |  68<H>  ----------------------------<  111<H>  4.9   |  22  |  5.71<H>    Ca    8.5      19 Aug 2023 13:18    TPro  6.6  /  Alb  3.2<L>  /  TBili  0.6  /  DBili  x   /  AST  31  /  ALT  17  /  AlkPhos  59  08-19      Pt is a 89 y/o male w/ a PMHx of HTN, CKD IV, prostate CA, dementia, anxiety, HLD, arrythmia, and right inguinal hernia s/p repair presents to the ED BIB being admitted for:    # Acute hypoxic respiratory failure  # AHRF 2/2 to PNA vs CHF exacerbation  - patient desaturated to 80s at home; now on 92-93% on 6L   - BNP elevated 30K; s/p IV 60mg Lasix   - CXR shows LLL infiltrate concerning for PNA  - decreased breath sounds on bibasilar lung fields, no wet rales auscultated, does not appear clinically hypervolemic   - s/p IV Zithromax and IV Rocephin; will continue     # Acute anemia 2/2 to Melena  - patient had large black tarry BM in ED  - Hb 7.0; received 1 U pRBC; repeat H/H to be collected  - start IV Protonix 40mg BID  - Patient is on home Amlodipine but will hold off on CCB Tx as will decrease cardiac output; consider restarting on dc   - GI referral requested  - will avoid crystalloid infusion at this time given AHRF likely 2/2 to CHF exacerbation     # Elevated troponins most likely due demand ischemia due to severe anemia  - worsening troponin  - acute rise in troponin less likely to be demand ischemia and now appears to be NSTEMI  - given acute GI bleed will not AC at this time as risks outweigh the benefits  - Cardiology consult with Dr. Quiñonez; spoke with him and agreeable to hold AC at this time  - EKG shows HR 72 with some PACs. No ST segment changes/TWI (personally reviewed)    # CKD  - Cr 5.71   - hx of CKD IV  - renal diet; avoid nephrotoxic medications     # VTE PPx  - will hold AC at this time 2/2 to acute GI bleed    #Dispo  - Guarded diagnosis at this time and family is aware and accept there is high risk for acute decompensation

## 2023-08-20 NOTE — CONSULT NOTE ADULT - SUBJECTIVE AND OBJECTIVE BOX
HPI:  89 y/o male w/ a PMHx of HTN, CKD IV, prostate CA, dementia, anxiety, HLD, arrythmia, and right inguinal hernia s/p repair presents to the ED BIB family for generalized weakness and mild SOB. Per EMS, pt was found to be hypoxic to 80s on RA, placed on 6L NC w/ improvement. Per the family patient has been unable to sleep the last several days and sustained a fall a bed but was caught by HHA.     In the ED patient had black tarry BM with positive stool Guaiac with Hb 7.0. Troponin also elevated at 472 and BNP 30k.  (19 Aug 2023 19:18)      PAST MEDICAL & SURGICAL HISTORY:  Arrhythmia      Pure hypercholesterolemia      Anxiety disorder      Dementia      Prostate cancer      Hypertension  was taken off medication by Dr Fischer 2016      History of loop recorder  LINQ hear monitor placed      H/O left knee surgery      H/O right inguinal hernia repair          Home Medications:  amLODIPine 10 mg oral tablet: 1 tab(s) orally once a day (19 Aug 2023 20:48)  atorvastatin 10 mg oral tablet: 1 tab(s) orally once a day (at bedtime) (19 Aug 2023 20:48)  citalopram 10 mg oral tablet: 1 tab(s) orally once a day (19 Aug 2023 20:48)  doxazosin 2 mg oral tablet: 1 tab(s) orally 2 times a day (19 Aug 2023 20:48)  ferrous sulfate 325 mg (65 mg elemental iron) oral tablet: 1 tab(s) orally once a day (19 Aug 2023 20:50)  furosemide 40 mg oral tablet: 1 tab(s) orally 3 times a week as needed for (19 Aug 2023 20:50)  hydrALAZINE 50 mg oral tablet: 1 tab(s) orally 2 times a day (19 Aug 2023 20:50)  sodium bicarbonate 650 mg oral tablet: 2 tab(s) orally 3 times a day (19 Aug 2023 20:47)      MEDICATIONS  (STANDING):  albuterol/ipratropium for Nebulization 3 milliLiter(s) Nebulizer every 6 hours  azithromycin  IVPB 500 milliGRAM(s) IV Intermittent every 24 hours  cefTRIAXone Injectable. 1000 milliGRAM(s) IV Push every 24 hours  ipratropium    for Nebulization 500 MICROGram(s) Nebulizer every 6 hours  pantoprazole  Injectable 40 milliGRAM(s) IV Push two times a day    MEDICATIONS  (PRN):  acetaminophen     Tablet .. 650 milliGRAM(s) Oral every 6 hours PRN Temp greater or equal to 38C (100.4F), Mild Pain (1 - 3)  LORazepam    IVPB 0.5 milliGRAM(s) IV Intermittent every 6 hours PRN Agitation  morphine  - Injectable 1 milliGRAM(s) IV Push every 6 hours PRN Moderate Pain (4 - 6)  morphine  - Injectable 1.5 milliGRAM(s) IV Push every 6 hours PRN Severe Pain (7 - 10)  ondansetron Injectable 4 milliGRAM(s) IV Push every 8 hours PRN Nausea and/or Vomiting      Allergies    No Known Allergies    Intolerances        SOCIAL HISTORY: Denies tobacco, etoh abuse or illicit drug use    FAMILY HISTORY:  Family history of hypertension        Vital Signs Last 24 Hrs  T(C): 37.1 (20 Aug 2023 09:00), Max: 37.4 (20 Aug 2023 03:30)  T(F): 98.7 (20 Aug 2023 09:00), Max: 99.4 (20 Aug 2023 03:30)  HR: 74 (20 Aug 2023 09:00) (65 - 77)  BP: 159/62 (20 Aug 2023 09:00) (123/62 - 159/62)  BP(mean): 89 (19 Aug 2023 21:00) (84 - 89)  RR: 20 (20 Aug 2023 09:00) (18 - 22)  SpO2: 96% (20 Aug 2023 09:00) (89% - 98%)    Parameters below as of 20 Aug 2023 09:00  Patient On (Oxygen Delivery Method): nasal cannula  O2 Flow (L/min): 6          REVIEW OF SYSTEMS:    CONSTITUTIONAL:  As per HPI.  HEENT:  Eyes:  No diplopia or blurred vision. ENT:  No earache, sore throat or runny nose.  CARDIOVASCULAR:  No pressure, squeezing, tightness, heaviness or aching about the chest, neck, axilla or epigastrium.  RESPIRATORY:  No cough, shortness of breath, PND or orthopnea.  GASTROINTESTINAL:  No nausea, vomiting or diarrhea.  GENITOURINARY:  No dysuria, frequency or urgency.  MUSCULOSKELETAL:  As per HPI.  SKIN:  No change in skin, hair or nails.  NEUROLOGIC:  No paresthesias, fasciculations, seizures or weakness.  PSYCHIATRIC:  No disorder of thought or mood.  ENDOCRINE:  No heat or cold intolerance, polyuria or polydipsia.  HEMATOLOGICAL:  No easy bruising or bleedings:  .     PHYSICAL EXAMINATION:    GENERAL APPEARANCE:  Pt. is not currently dyspneic, in no distress. Pt. is alert, oriented, and pleasant.  HEENT:  Pupils are normal and react normally. No icterus. Mucous membranes well colored.  NECK:  Supple. No lymphadenopathy. Jugular venous pressure not elevated. Carotids equal.   HEART:   The cardiac impulse has a normal quality. Regular. Normal S1 and S2. There are no murmurs, rubs or gallops noted  CHEST:  Chest is clear to auscultation. Normal respiratory effort.  ABDOMEN:  Soft and nontender.   EXTREMITIES:  There is no cyanosis, clubbing or edema.   SKIN:  No rash or significant lesions are noted.    LABS:                        7.5    x     )-----------( x        ( 20 Aug 2023 01:13 )             22.4     08-19    140  |  109<H>  |  68<H>  ----------------------------<  111<H>  4.9   |  22  |  5.71<H>    Ca    8.5      19 Aug 2023 13:18    TPro  6.6  /  Alb  3.2<L>  /  TBili  0.6  /  DBili  x   /  AST  31  /  ALT  17  /  AlkPhos  59  08-19    LIVER FUNCTIONS - ( 19 Aug 2023 13:18 )  Alb: 3.2 g/dL / Pro: 6.6 gm/dL / ALK PHOS: 59 U/L / ALT: 17 U/L / AST: 31 U/L / GGT: x           PT/INR - ( 19 Aug 2023 13:18 )   PT: 11.6 sec;   INR: 1.03 ratio         PTT - ( 19 Aug 2023 13:18 )  PTT:26.2 sec      Urinalysis Basic - ( 19 Aug 2023 13:18 )    Color: x / Appearance: x / SG: x / pH: x  Gluc: 111 mg/dL / Ketone: x  / Bili: x / Urobili: x   Blood: x / Protein: x / Nitrite: x   Leuk Esterase: x / RBC: x / WBC x   Sq Epi: x / Non Sq Epi: x / Bacteria: x            RADIOLOGY & ADDITIONAL STUDIES:        HPI:  89 y/o male w/ a PMHx of HTN, CKD IV, prostate CA, dementia, anxiety, HLD, arrythmia, and right inguinal hernia s/p repair presents to the ED BIB family for generalized weakness and mild SOB. Per EMS, pt was found to be hypoxic to 80s on RA, placed on 6L NC w/ improvement. Per the family patient has been unable to sleep the last several days and sustained a fall a bed but was caught by HHA. In the ED patient had black tarry BM with positive stool Guaiac with Hb 7.0. Troponin also elevated at 472 and BNP 30k.  pat seen for pulmonary eval, wheezing & sob, lying in bed.      PAST MEDICAL & SURGICAL HISTORY:  Arrhythmia      Pure hypercholesterolemia      Anxiety disorder      Dementia      Prostate cancer      Hypertension  was taken off medication by Dr Fischer 2016      History of loop recorder  LINQ hear monitor placed      H/O left knee surgery      H/O right inguinal hernia repair          Home Medications:  amLODIPine 10 mg oral tablet: 1 tab(s) orally once a day (19 Aug 2023 20:48)  atorvastatin 10 mg oral tablet: 1 tab(s) orally once a day (at bedtime) (19 Aug 2023 20:48)  citalopram 10 mg oral tablet: 1 tab(s) orally once a day (19 Aug 2023 20:48)  doxazosin 2 mg oral tablet: 1 tab(s) orally 2 times a day (19 Aug 2023 20:48)  ferrous sulfate 325 mg (65 mg elemental iron) oral tablet: 1 tab(s) orally once a day (19 Aug 2023 20:50)  furosemide 40 mg oral tablet: 1 tab(s) orally 3 times a week as needed for (19 Aug 2023 20:50)  hydrALAZINE 50 mg oral tablet: 1 tab(s) orally 2 times a day (19 Aug 2023 20:50)  sodium bicarbonate 650 mg oral tablet: 2 tab(s) orally 3 times a day (19 Aug 2023 20:47)      MEDICATIONS  (STANDING):  albuterol/ipratropium for Nebulization 3 milliLiter(s) Nebulizer every 6 hours  azithromycin  IVPB 500 milliGRAM(s) IV Intermittent every 24 hours  cefTRIAXone Injectable. 1000 milliGRAM(s) IV Push every 24 hours  ipratropium    for Nebulization 500 MICROGram(s) Nebulizer every 6 hours  pantoprazole  Injectable 40 milliGRAM(s) IV Push two times a day    MEDICATIONS  (PRN):  acetaminophen     Tablet .. 650 milliGRAM(s) Oral every 6 hours PRN Temp greater or equal to 38C (100.4F), Mild Pain (1 - 3)  LORazepam    IVPB 0.5 milliGRAM(s) IV Intermittent every 6 hours PRN Agitation  morphine  - Injectable 1 milliGRAM(s) IV Push every 6 hours PRN Moderate Pain (4 - 6)  morphine  - Injectable 1.5 milliGRAM(s) IV Push every 6 hours PRN Severe Pain (7 - 10)  ondansetron Injectable 4 milliGRAM(s) IV Push every 8 hours PRN Nausea and/or Vomiting      Allergies    No Known Allergies    Intolerances        SOCIAL HISTORY: Denies tobacco, etoh abuse or illicit drug use    FAMILY HISTORY:  Family history of hypertension        Vital Signs Last 24 Hrs  T(C): 37.1 (20 Aug 2023 09:00), Max: 37.4 (20 Aug 2023 03:30)  T(F): 98.7 (20 Aug 2023 09:00), Max: 99.4 (20 Aug 2023 03:30)  HR: 74 (20 Aug 2023 09:00) (65 - 77)  BP: 159/62 (20 Aug 2023 09:00) (123/62 - 159/62)  BP(mean): 89 (19 Aug 2023 21:00) (84 - 89)  RR: 20 (20 Aug 2023 09:00) (18 - 22)  SpO2: 96% (20 Aug 2023 09:00) (89% - 98%)    Parameters below as of 20 Aug 2023 09:00  Patient On (Oxygen Delivery Method): nasal cannula  O2 Flow (L/min): 6          REVIEW OF SYSTEMS:    CONSTITUTIONAL:  As per HPI.  HEENT:  Eyes:  No diplopia or blurred vision. ENT:  No earache, sore throat or runny nose.  CARDIOVASCULAR:  No pressure, squeezing, tightness, heaviness or aching about the chest, neck, axilla or epigastrium.  RESPIRATORY:  No cough, +shortness of breath, PND or orthopnea.  GASTROINTESTINAL:  No nausea, vomiting or diarrhea.  GENITOURINARY:  No dysuria, frequency or urgency.  MUSCULOSKELETAL:  As per HPI.  SKIN:  No change in skin, hair or nails.  NEUROLOGIC:  No paresthesias, fasciculations, seizures or weakness.  PSYCHIATRIC:  No disorder of thought or mood.  ENDOCRINE:  No heat or cold intolerance, polyuria or polydipsia.  HEMATOLOGICAL:  No easy bruising or bleedings:  .     PHYSICAL EXAMINATION:    GENERAL APPEARANCE:  Pt. is not currently dyspneic, in no distress. Pt. is alert, oriented, and pleasant.  HEENT:  Pupils are normal and react normally. No icterus. Mucous membranes well colored.  NECK:  Supple. No lymphadenopathy. Jugular venous pressure not elevated. Carotids equal.   HEART:   The cardiac impulse has a normal quality. Regular. Normal S1 and S2. There are no murmurs, rubs or gallops noted  CHEST:  Chest rhonchi to auscultation. Normal respiratory effort.  ABDOMEN:  Soft and nontender.   EXTREMITIES:  There is no cyanosis, clubbing or edema.   SKIN:  No rash or significant lesions are noted.    LABS:                        7.5    x     )-----------( x        ( 20 Aug 2023 01:13 )             22.4     08-19    140  |  109<H>  |  68<H>  ----------------------------<  111<H>  4.9   |  22  |  5.71<H>    Ca    8.5      19 Aug 2023 13:18    TPro  6.6  /  Alb  3.2<L>  /  TBili  0.6  /  DBili  x   /  AST  31  /  ALT  17  /  AlkPhos  59  08-19    LIVER FUNCTIONS - ( 19 Aug 2023 13:18 )  Alb: 3.2 g/dL / Pro: 6.6 gm/dL / ALK PHOS: 59 U/L / ALT: 17 U/L / AST: 31 U/L / GGT: x           PT/INR - ( 19 Aug 2023 13:18 )   PT: 11.6 sec;   INR: 1.03 ratio         PTT - ( 19 Aug 2023 13:18 )  PTT:26.2 sec      Urinalysis Basic - ( 19 Aug 2023 13:18 )    Color: x / Appearance: x / SG: x / pH: x  Gluc: 111 mg/dL / Ketone: x  / Bili: x / Urobili: x   Blood: x / Protein: x / Nitrite: x   Leuk Esterase: x / RBC: x / WBC x   Sq Epi: x / Non Sq Epi: x / Bacteria: x            RADIOLOGY & ADDITIONAL STUDIES:     Xray Chest 1 View AP/PA. (07.08.23 @ 11:21) >    IMPRESSION: Question of diffuse lung infiltrates greater on the right   than on the left versus artifact from portable technique. Recommend   repeat follow-up radiograph as clinically warranted.

## 2023-08-20 NOTE — PROGRESS NOTE ADULT - CONVERSATION DETAILS
- Goals of Care further discussed with daughter (+ HCP): + DNR/DNI, NIV acceptable as short-term measure, IVF & IV Abx allowable as medically indicated, PRBC transfusion allowable as medically indicated.  NO vasopressors, no feeding tube.  New MOLST form completed DNR/ DNI  Poor quality of life  Family wants inpatient rehab but there are pending discussions  Comfort care orders initiated  no further lab work

## 2023-08-21 NOTE — DISCHARGE NOTE PROVIDER - NSDCCPCAREPLAN_GEN_ALL_CORE_FT
PRINCIPAL DISCHARGE DIAGNOSIS  Diagnosis: Community acquired pneumonia of left lung  Assessment and Plan of Treatment:       SECONDARY DISCHARGE DIAGNOSES  Diagnosis: Anemia  Assessment and Plan of Treatment:     Diagnosis: Stool guaiac positive  Assessment and Plan of Treatment:     Diagnosis: Chronic kidney disease, unspecified CKD stage  Assessment and Plan of Treatment:     Diagnosis: Acute respiratory failure with hypoxia  Assessment and Plan of Treatment:     Diagnosis: Troponin I above reference range  Assessment and Plan of Treatment:     Diagnosis: Cardiac volume overload  Assessment and Plan of Treatment:

## 2023-08-21 NOTE — DISCHARGE NOTE NURSING/CASE MANAGEMENT/SOCIAL WORK - PATIENT PORTAL LINK FT
You can access the FollowMyHealth Patient Portal offered by [facility] by registering at the following website: [email or web address]. By joining [employer]’s FollowMyHealth portal, you will also be able to view your health information using other applications (apps) compatible with our system.

## 2023-08-21 NOTE — PROGRESS NOTE ADULT - SUBJECTIVE AND OBJECTIVE BOX
Subjective:    pat better, sleeping, but arousable, no respiratory distress.    MEDICATIONS  (STANDING):  albuterol/ipratropium for Nebulization 3 milliLiter(s) Nebulizer every 6 hours  azithromycin  IVPB 500 milliGRAM(s) IV Intermittent every 24 hours  cefTRIAXone Injectable. 1000 milliGRAM(s) IV Push every 24 hours  ipratropium    for Nebulization 500 MICROGram(s) Nebulizer every 6 hours  methylPREDNISolone sodium succinate Injectable 40 milliGRAM(s) IV Push every 8 hours  pantoprazole  Injectable 40 milliGRAM(s) IV Push two times a day    MEDICATIONS  (PRN):  acetaminophen     Tablet .. 650 milliGRAM(s) Oral every 6 hours PRN Temp greater or equal to 38C (100.4F), Mild Pain (1 - 3)  LORazepam   Injectable 0.5 milliGRAM(s) IV Push every 6 hours PRN Agitation  morphine  - Injectable 1 milliGRAM(s) IV Push every 6 hours PRN Moderate Pain (4 - 6)  morphine  - Injectable 1.5 milliGRAM(s) IV Push every 6 hours PRN Severe Pain (7 - 10)  ondansetron Injectable 4 milliGRAM(s) IV Push every 8 hours PRN Nausea and/or Vomiting      Allergies    No Known Allergies    Intolerances        Vital Signs Last 24 Hrs  T(C): 36.7 (21 Aug 2023 09:01), Max: 36.7 (21 Aug 2023 09:01)  T(F): 98.1 (21 Aug 2023 09:01), Max: 98.1 (21 Aug 2023 09:01)  HR: 70 (21 Aug 2023 09:01) (66 - 71)  BP: 153/89 (21 Aug 2023 09:01) (153/89 - 159/56)  BP(mean): --  RR: 18 (21 Aug 2023 09:01) (18 - 18)  SpO2: 98% (21 Aug 2023 09:01) (96% - 98%)    Parameters below as of 21 Aug 2023 09:01  Patient On (Oxygen Delivery Method): nasal cannula  O2 Flow (L/min): 2        PHYSICAL EXAMINATION:    NECK:  Supple. No lymphadenopathy. Jugular venous pressure not elevated. Carotids equal.   HEART:   The cardiac impulse has a normal quality. Reg., Nl S1 and S2.  There are no murmurs, rubs or gallops noted  CHEST:  Chest is clear to auscultation. Normal respiratory effort.  ABDOMEN:  Soft and nontender.   EXTREMITIES:  There is no edema.       LABS:                        7.5    x     )-----------( x        ( 20 Aug 2023 01:13 )             22.4

## 2023-08-21 NOTE — DISCHARGE NOTE NURSING/CASE MANAGEMENT/SOCIAL WORK - NSDCVIVACCINE_GEN_ALL_CORE_FT
Tdap; 08-Jul-2023 10:52; Leah Montgomery (RN); [employer]; [id number] (Exp. Date: 08-Mar-2025); IntraMuscular; Deltoid Right.; 0.5 milliLiter(s); VIS (VIS Published: 09-May-2013, VIS Presented: 08-Jul-2023);

## 2023-08-21 NOTE — CONSULT NOTE ADULT - SUBJECTIVE AND OBJECTIVE BOX
HPI:  Per Notes: Patient is a 89 y/o male w/ a PMHx of HTN, CKD IV, prostate CA, dementia, anxiety, HLD, arrythmia, and right inguinal hernia s/p repair presents to the ED BIB family for generalized weakness and mild SOB. Per EMS, pt was found to be hypoxic to 80s on RA, placed on 6L NC w/ improvement. Per the family patient has been unable to sleep the last several days and sustained a fall a bed but was caught by HHA.     In the ED patient had black tarry BM with positive stool Guaiac with Hb 7.0. Troponin also elevated at 472 and BNP 30k.     One Content Reviewed:  HCP and living will   Paper Chart Reviewed -- there are two MOLST.  each stating DNR.  most recent form indicates trial of non-invasive.     seen at bedside, pt is not in distress.  sleeping but arousable.  verbal responses are difficult to understand.   d/w SW  - a referral has already been placed to [facility], and as of writing this note, he has been accepted and transport arranged for this afternoon.         PAIN: ( )Yes   (x)No  DYSPNEA: ( ) Yes  (x) No      PAST MEDICAL & SURGICAL HISTORY:  Arrhythmia  Pure hypercholesterolemia  Anxiety disorder  Dementia  Prostate cancer  Hypertension - was taken off medication by Dr Fischer   History of loop recorder LINQ hear monitor placed  H/O left knee surgery  H/O right inguinal hernia repair      SOCIAL HX:    Hx opiate tolerance ( )YES  (x)NO    Baseline ADLs  (Prior to Admission)  ( ) Independent   (x)Dependent     ( ) With Assistance    FAMILY HISTORY:  Family history of hypertension        Review of Systems:  Unable to obtain/Limited due to: dementia/delirium      PHYSICAL EXAM:    Vital Signs Last 24 Hrs  T(C): 36.7 (21 Aug 2023 09:01), Max: 37.2 (20 Aug 2023 16:12)  T(F): 98.1 (21 Aug 2023 09:01), Max: 98.9 (20 Aug 2023 16:12)  HR: 70 (21 Aug 2023 09:01) (66 - 80)  BP: 153/89 (21 Aug 2023 09:01) (153/89 - 159/56)  BP(mean): --  RR: 18 (21 Aug 2023 09:01) (18 - 19)  SpO2: 98% (21 Aug 2023 09:01) (95% - 98%)    Parameters below as of 21 Aug 2023 09:01  Patient On (Oxygen Delivery Method): nasal cannula  O2 Flow (L/min): 2    Daily     Daily Weight in k.2 (21 Aug 2023 05:51)       - GENERAL: Arousable. No acute distress.   - EYES: EOMI. Anicteric.   - HENT: Moist mucous membranes.   - LUNGS: Clear to auscultation bilaterally. No accessory muscle use.   - CARDIOVASCULAR: Regular rate and rhythm.  No edema.   - ABDOMEN: Soft, non-tender and non-distended.   - EXTREMITIES: No edema. Non-tender.  sarcopenia of legs  - SKIN: Warm, no rashes or lesions easily visible.   - NEUROLOGIC: No focal neurological deficits.        LABS:                        7.5    x     )-----------( x        ( 20 Aug 2023 01:13 )             22.4         140  |  109<H>  |  68<H>  ----------------------------<  111<H>  4.9   |  22  |  5.71<H>    Ca    8.5      19 Aug 2023 13:18    TPro  6.6  /  Alb  3.2<L>  /  TBili  0.6  /  DBili  x   /  AST  31  /  ALT  17  /  AlkPhos  59      PT/INR - ( 19 Aug 2023 13:18 )   PT: 11.6 sec;   INR: 1.03 ratio         PTT - ( 19 Aug 2023 13:18 )  PTT:26.2 sec  Albumin: Albumin: 3.2 g/dL ( @ 13:18)      Allergies    No Known Allergies    Intolerances      MEDICATIONS  (STANDING):  albuterol/ipratropium for Nebulization 3 milliLiter(s) Nebulizer every 6 hours  azithromycin  IVPB 500 milliGRAM(s) IV Intermittent every 24 hours  cefTRIAXone Injectable. 1000 milliGRAM(s) IV Push every 24 hours  ipratropium    for Nebulization 500 MICROGram(s) Nebulizer every 6 hours  methylPREDNISolone sodium succinate Injectable 40 milliGRAM(s) IV Push every 8 hours  pantoprazole  Injectable 40 milliGRAM(s) IV Push two times a day    MEDICATIONS  (PRN):  acetaminophen     Tablet .. 650 milliGRAM(s) Oral every 6 hours PRN Temp greater or equal to 38C (100.4F), Mild Pain (1 - 3)  LORazepam   Injectable 0.5 milliGRAM(s) IV Push every 6 hours PRN Agitation  morphine  - Injectable 1 milliGRAM(s) IV Push every 6 hours PRN Moderate Pain (4 - 6)  morphine  - Injectable 1.5 milliGRAM(s) IV Push every 6 hours PRN Severe Pain (7 - 10)  ondansetron Injectable 4 milliGRAM(s) IV Push every 8 hours PRN Nausea and/or Vomiting      RADIOLOGY/ADDITIONAL STUDIES:

## 2023-08-21 NOTE — CONSULT NOTE ADULT - ASSESSMENT
Assessment:     89 y/o male with acute respiratory failure, likely 2/2 PNA    Process of Care  --Reviewed dx/treatment problems and alignment with Goals of Care    Physical Aspects of Care  --Pain - monitor for non-verbal signs/symptoms of distress.  agree with morphine as ordered  --Bowel Regimen - Risk for constipation d/t immobility. Suggest daily dulcolax as needed  --Dyspnea - on steroids and antibiotics. nebulizers.  supplemental O2 as needed.  if respiratory distress, can give the low dose morphine as ordered.   --Debility/Weakness -   fall precautions  --Acute Metabolic Encephalopathy/Dementia - supportive measures. verbal redirection/reorientation if agitated.  benzo only if pt is danger to himself. .      Psychological and Psychiatric Aspects of Care:   --Grief/Bereavement: emotional support provided  --Hx of psychiatric dx: dementia  -Pastoral Care Available PRN     Social Aspects of Care  -Palliative SW are available as needed    Cultural Aspects  -Primary Language: English    Goals of Care:  d/w primary team.  pt has been referred to [facility].  transportation arranged for this afternoon     Ethical and Legal Aspects: NA  Capacity- pt lacks capacity    HCP/Surrogate: lucina Bernstein    Code Status- DNR  MOLST- in chart  Dispo Plan- planning for Inpatient Hospice.     Discussed With: Patrick Marie MD

## 2023-08-21 NOTE — DISCHARGE NOTE PROVIDER - HOSPITAL COURSE
Reason for Admission: Melena, Shortness of breath      Patient is a 89 y/o male w/ a PMHx of HTN, CKD IV, prostate CA, dementia, anxiety, HLD, arrythmia, and right inguinal hernia s/p repair presents to the ED BIB family for generalized weakness and mild SOB. Per EMS, pt was found to be hypoxic to 80s on RA, placed on 6L NC w/ improvement. Per the family patient has been unable to sleep the last several days and sustained a fall a bed but was caught by HHA.     In the ED patient had black tarry BM with positive stool Guaiac with Hb 7.0. Troponin also elevated at 472 and BNP 30k.     Long conversation with family at this time due to patient's deteriorated state - family requesting inpatient hospice. Patient with poor prognosis.     Physical Exam:   GENERAL APPEARANCE:  deconditioend, sick, decreased po intake  T(C): 36.7 (08-21-23 @ 09:01), Max: 37.2 (08-20-23 @ 16:12)  HR: 70 (08-21-23 @ 09:01) (66 - 80)  BP: 153/89 (08-21-23 @ 09:01) (153/89 - 159/56)  RR: 18 (08-21-23 @ 09:01) (18 - 19)  SpO2: 98% (08-21-23 @ 09:01) (95% - 98%)  HEENT:  temporal atrophy /  try oral mucousa  Skin: thin / pale /  dry  NECK: muscle wasting  HEART:  YURI  LUNGS:  some upper respiratory wheezing (resolved with steroids)  ABDOMEN:  Soft   EXTREMITIES:  Without cyanosis, clubbing or edema.   NEUROLOGICAL:  Gross nonfocal     Poor quality of life. Poor functional state. Failure to thrive. Multiorgan failure Reason for Admission: Melena, Shortness of breath    Patient is a 89 y/o male w/ a PMHx of HTN, CKD IV, prostate CA, dementia, anxiety, HLD, arrythmia, and right inguinal hernia s/p repair presents to the ED BIB family for generalized weakness and mild SOB. Per EMS, pt was found to be hypoxic to 80s on RA, placed on 6L NC w/ improvement. Per the family patient has been unable to sleep the last several days and sustained a fall a bed but was caught by HHA.     In the ED patient had black tarry BM with positive stool Guaiac with Hb 7.0. Troponin also elevated at 472 and BNP 30k.     Long conversation with family at this time due to patient's deteriorated state - family requesting inpatient hospice. Patient with poor prognosis.    Family wants patient to be very comfortable. No further antibiotics.     Physical Exam:   GENERAL APPEARANCE:  deconditioend, sick, decreased po intake  T(C): 36.7 (08-21-23 @ 09:01), Max: 37.2 (08-20-23 @ 16:12)  HR: 70 (08-21-23 @ 09:01) (66 - 80)  BP: 153/89 (08-21-23 @ 09:01) (153/89 - 159/56)  RR: 18 (08-21-23 @ 09:01) (18 - 19)  SpO2: 98% (08-21-23 @ 09:01) (95% - 98%)  HEENT:  temporal atrophy /  try oral mucousa  Skin: thin / pale /  dry  NECK: muscle wasting  HEART:  YURI  LUNGS:  some upper respiratory wheezing (resolved with steroids)  ABDOMEN:  Soft   EXTREMITIES:  Without cyanosis, clubbing or edema.   NEUROLOGICAL:  Gross nonfocal     Poor quality of life. Poor functional state. Failure to thrive. Multiorgan failure

## 2023-08-21 NOTE — PROGRESS NOTE ADULT - ASSESSMENT
Pt is a 89 y/o male w/ a PMHx of HTN, CKD IV, prostate CA, dementia, anxiety, HLD, arrythmia, and right inguinal hernia s/p repair presents to the ED BIB being admitted for:    PROBLEMS:    Acute hypoxic respiratory failure  AHRF 2/2 to PNA vs CHF exacerbation/asthmatic bronchitis with wheeze  CXR shows LLL infiltrate concerning for PNA  Acute anemia 2/2 to Melena  Elevated troponins most likely due demand ischemia due to severe anemia  CKD    PLAN:    pulmonary improved-family considering comfort care  fiO2 6L NC TITRATE-98%  IV Lasix   IV Zithromax and IV Rocephin   IV solumedrols  NEB  S/P 1 U pRBC; repeat H/H   IV Protonix 40mg BID  Monitor renal fx  VTE PPx-hold AC at this time 2/2 to acute GI bleed

## 2024-02-02 NOTE — DISCHARGE NOTE ADULT - CARE PLAN
Principal Discharge DX:	SVT (supraventricular tachycardia)  Goal:	to remain free of infection  Assessment and plan of treatment:	Resume all medications, no unnecessary stair climbing for 24-48 hrs.  May shower.  To call for any questions or concerns.
0

## 2025-01-10 NOTE — ED ADULT TRIAGE NOTE - ACCOMPANIED BY
Caller: Florentino Graves     Doctor and/or Office: Dr. Espinal/Jackson    #: [phone]    Escalation: Appointment Patient was seen in 2022 by Dr. 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 His left foot is now bothering him again. He knows there are restrictions on how many injections you can get within a time frame and would like to know if its possible to get another one on Tues at his visit with Dr. Espinal? (Dr. Espinal would have to first recommend this, but he wants to be sure its even an option with the time frame) Please call and advise. Thank you  
Spoke to PT
EMT/paramedic